# Patient Record
Sex: FEMALE | Race: OTHER | HISPANIC OR LATINO | URBAN - METROPOLITAN AREA
[De-identification: names, ages, dates, MRNs, and addresses within clinical notes are randomized per-mention and may not be internally consistent; named-entity substitution may affect disease eponyms.]

---

## 2022-07-01 ENCOUNTER — EMERGENCY (EMERGENCY)
Facility: HOSPITAL | Age: 27
LOS: 1 days | Discharge: HOME | End: 2022-07-01
Attending: EMERGENCY MEDICINE
Payer: MEDICAID

## 2022-07-01 VITALS
RESPIRATION RATE: 18 BRPM | OXYGEN SATURATION: 99 % | TEMPERATURE: 99 F | HEIGHT: 60 IN | DIASTOLIC BLOOD PRESSURE: 55 MMHG | HEART RATE: 79 BPM | SYSTOLIC BLOOD PRESSURE: 116 MMHG

## 2022-07-01 PROCEDURE — 99283 EMERGENCY DEPT VISIT LOW MDM: CPT

## 2022-07-01 PROCEDURE — 93005 ELECTROCARDIOGRAM TRACING: CPT

## 2022-07-01 PROCEDURE — 99284 EMERGENCY DEPT VISIT MOD MDM: CPT

## 2022-07-01 PROCEDURE — 93010 ELECTROCARDIOGRAM REPORT: CPT

## 2022-07-01 NOTE — ED PROVIDER NOTE - NS ED ATTENDING STATEMENT MOD
This was a shared visit with the GAURI. I reviewed and verified the documentation and independently performed the documented:

## 2022-07-01 NOTE — ED PROVIDER NOTE - PATIENT PORTAL LINK FT
You can access the FollowMyHealth Patient Portal offered by [facility] by registering at the following website: [email or web address]. By joining [employer]’s FollowMyHealth portal, you will also be able to view your health information using other applications (apps) compatible with our system.

## 2022-07-01 NOTE — ED PROVIDER NOTE - CLINICAL SUMMARY MEDICAL DECISION MAKING FREE TEXT BOX
27 yr old F presented to ED with chest pain that occurred with walking. Pt states that pain was in the middle of her chest and it has since resolved while in the ED. Pt denies any chest pain at rest , history of cardiac illness, Family history of cardiac illness. Examination: Normal findings and normal EKG. Pt D/C and will F/U with Cardiac clinic.

## 2022-07-01 NOTE — ED PROVIDER NOTE - ATTENDING APP SHARED VISIT CONTRIBUTION OF CARE
CP , now resolved  on depo shot  no other symptoms revealed to me  pe as documented  agree with care plan

## 2022-07-01 NOTE — ED ADULT TRIAGE NOTE - CHIEF COMPLAINT QUOTE
pt with generalized chest pressure worsening with ambulation. denies medical hx. ambulatory in NAD. EKG performed. pt with generalized chest pressure worsening with ambulation that has since improved. denies medical hx. ambulatory in NAD. EKG performed.

## 2022-07-01 NOTE — ED PROVIDER NOTE - NSFOLLOWUPINSTRUCTIONS_ED_ALL_ED_FT
Return to ED if chest pain reoccurs   Followup with   1 Cardiologist   2. Spine clinic   3. Followup with your primary care Physician

## 2022-07-01 NOTE — ED PROVIDER NOTE - NSFOLLOWUPCLINICS_GEN_ALL_ED_FT
[facility]  Ortho/Spine  [address] [place] [id number]  Phone: [phone]  Fax:   Follow Up Time: 7-10 Days

## 2022-07-01 NOTE — ED PROVIDER NOTE - OBJECTIVE STATEMENT
27 yr old F presented to ED with chest pain that occurred with walking. Pt states that pain was in the middle of her chest and it has since resolved while in the ED. Pt denies any chest pain at rest , history of cardiac illness, Family history of cardiac illness. Pt denies any current chest pain, dizziness, SOB, dizziness or abdominal pain. Pt denies cigarettes , ETOH or drug use. Pt admits to a history of chronic back pain and wants to be given information for back pain clinic.

## 2022-07-01 NOTE — ED PROVIDER NOTE - CARE PROVIDER_API CALL
Carlene Grimes (DO)  Internal Medicine  [address] [place] [id number]  Phone: [phone]  Fax: [phone]  Follow Up Time: 4-6 Days

## 2022-07-01 NOTE — ED PROVIDER NOTE - PROGRESS NOTE DETAILS
Pt EKG performed and shows normal sinus rhythm. Pt D/C in stable condition with F/U information for Cardiac clinic.

## 2022-07-08 PROBLEM — Z00.00 ENCOUNTER FOR PREVENTIVE HEALTH EXAMINATION: Status: ACTIVE | Noted: 2022-07-08

## 2022-07-19 ENCOUNTER — APPOINTMENT (OUTPATIENT)
Dept: ORTHOPEDIC SURGERY | Facility: CLINIC | Age: 27
End: 2022-07-19

## 2022-07-19 VITALS
HEIGHT: 61 IN | WEIGHT: 205 LBS | HEART RATE: 89 BPM | BODY MASS INDEX: 38.71 KG/M2 | DIASTOLIC BLOOD PRESSURE: 77 MMHG | SYSTOLIC BLOOD PRESSURE: 118 MMHG

## 2022-07-19 DIAGNOSIS — Z78.9 OTHER SPECIFIED HEALTH STATUS: ICD-10-CM

## 2022-07-19 PROCEDURE — 72100 X-RAY EXAM L-S SPINE 2/3 VWS: CPT

## 2022-07-19 PROCEDURE — 99203 OFFICE O/P NEW LOW 30 MIN: CPT

## 2022-07-19 NOTE — HISTORY OF PRESENT ILLNESS
[Stable] : stable [10] : a maximum pain level of 10/10 [de-identified] : Patient states she fell from standing height approximately 2 weeks ago she presented to the emergency department was diagnosed with chest pain she was referred to her primary care physician for chest pain further evaluation and work-up.  She stated at that time she has some chronic intermittent low back pain referred to orthopedics.  No complaints of weakness and/or radiculopathy in her right upper extremity and her lower extremities and of note this was conducted with a formal [facility]

## 2022-07-19 NOTE — DISCUSSION/SUMMARY
[de-identified] : Based upon age of 27 years old with an isolated right scapulothoracic syndrome patient is going to be referred to physical therapy for soft tissue modalities.  I also placed a PM&R consult in the chart because of physical therapy fails I believe physical medicine evaluation for possible MRI injection therapy etc. is reasonable prior to surgical consultations.  Patient can follow-up on a as needed basis

## 2022-07-19 NOTE — PHYSICAL EXAM
[de-identified] : CONSTITUTIONAL: The patient is a very pleasant individual who is well-nourished and who appears stated age.\par PSYCHIATRIC: The patient is alert and oriented X 3 and in no apparent distress, and participates with orthopedic evaluation well.\par HEAD: Atraumatic and is nonsyndromic in appearance.\par EENT: No visible thyromegaly, EOMI.\par RESPIRATORY: Respiratory rate is regular, not dyspneic on examination.\par LYMPHATICS: There is no inguinal lymphadenopathy\par INTEGUMENTARY: Skin is clean, dry, and intact about the bilateral lower extremities and lumbar spine.\par VASCULAR: There is brisk capillary refill about the bilateral lower extremities.\par NEUROLOGIC: There are no pathologic reflexes. There is no decrease in sensation of the bilateral lower extremities on Wartenberg pinwheel examination. Deep tendon reflexes are well maintained at 2+/4 of the bilateral lower extremities and are symmetric..\par MUSCULOSKELETAL: There is no visible muscular atrophy. Manual motor strength is well maintained in the bilateral lower extremities. Range of motion of lumbar spine is well maintained. The patient ambulates in a non-myelopathic manner. Negative tension sign and straight leg raise bilaterally. Quad extension, ankle dorsiflexion, EHL, plantar flexion, and ankle eversion are well preserved. Normal secondary orthopaedic exam of bilateral hips, greater trochanteric area, knees and ankles.  Physical exam was consistent with right scapulothoracic signs and symptoms/scapulothoracic syndrome [de-identified] : Lumbar x-ray was reviewed on today's date that shows mild age-appropriate lumbar degenerative disc disease

## 2022-10-14 ENCOUNTER — APPOINTMENT (OUTPATIENT)
Dept: OBGYN | Facility: CLINIC | Age: 27
End: 2022-10-14

## 2022-10-14 VITALS
SYSTOLIC BLOOD PRESSURE: 125 MMHG | HEIGHT: 61 IN | DIASTOLIC BLOOD PRESSURE: 72 MMHG | BODY MASS INDEX: 39.65 KG/M2 | WEIGHT: 210 LBS

## 2022-10-14 LAB
HCG UR QL: NEGATIVE
QUALITY CONTROL: YES

## 2022-10-14 PROCEDURE — 81025 URINE PREGNANCY TEST: CPT

## 2022-10-14 PROCEDURE — 99203 OFFICE O/P NEW LOW 30 MIN: CPT

## 2022-10-14 NOTE — REVIEW OF SYSTEMS
[Pelvic pain] : pelvic pain [FreeTextEntry8] : Itch, discharge [de-identified] : dark skin under breasts

## 2022-10-14 NOTE — PHYSICAL EXAM
[Chaperone Present] : A chaperone was present in the examining room during all aspects of the physical examination [Appropriately responsive] : appropriately responsive [Alert] : alert [Soft] : soft [No Acute Distress] : no acute distress [Non-distended] : non-distended [Labia Majora] : normal [Labia Minora] : normal [No Bleeding] : There was no active vaginal bleeding [Normal] : normal [Uterine Adnexae] : non-palpable [FreeTextEntry1] : ROHIT Jalloh [FreeTextEntry7] : mildly tender to mid and bilateral pelvic area on palpation [Tenderness] : nontender

## 2022-10-14 NOTE — HISTORY OF PRESENT ILLNESS
[Patient reported PAP Smear was normal] : Patient reported PAP Smear was normal [N] : Patient does not use contraception [Y] : Positive pregnancy history [Menarche Age: ____] : age at menarche was [unfilled] [Currently Active] : currently active [PapSmeardate] : 2021 [LMPDate] : 2020 [PGHxTotal] : 2 [[facility]] : 2 [[facility]] : 2 [FreeTextEntry1] : 2020

## 2022-10-14 NOTE — PLAN
[FreeTextEntry1] : -F/u labs and vaginal cultures \par -Discussed secondary amenorrhea being a possibly normal result of continued nursing \par -RTO TVUS evaluate structural causes of pelvic pain\par -Will do annual exam at time of return visit \par -Call or RTO PRN for any new problems, questions or concerns

## 2022-10-17 LAB
C TRACH RRNA SPEC QL NAA+PROBE: NOT DETECTED
CANDIDA VAG CYTO: DETECTED
ESTIMATED AVERAGE GLUCOSE: 100 MG/DL
G VAGINALIS+PREV SP MTYP VAG QL MICRO: DETECTED
HBA1C MFR BLD HPLC: 5.1 %
HCG SERPL-MCNC: <1 MIU/ML
N GONORRHOEA RRNA SPEC QL NAA+PROBE: NOT DETECTED
PROLACTIN SERPL-MCNC: 12.5 NG/ML
SOURCE AMPLIFICATION: NORMAL
T VAGINALIS VAG QL WET PREP: NOT DETECTED
TESTOST SERPL-MCNC: 45.5 NG/DL
TSH SERPL-ACNC: 2.1 UIU/ML

## 2022-10-19 ENCOUNTER — APPOINTMENT (OUTPATIENT)
Dept: ANTEPARTUM | Facility: CLINIC | Age: 27
End: 2022-10-19

## 2022-10-19 ENCOUNTER — ASOB RESULT (OUTPATIENT)
Age: 27
End: 2022-10-19

## 2022-10-19 ENCOUNTER — APPOINTMENT (OUTPATIENT)
Dept: OBGYN | Facility: CLINIC | Age: 27
End: 2022-10-19

## 2022-10-19 VITALS
DIASTOLIC BLOOD PRESSURE: 70 MMHG | HEIGHT: 61 IN | SYSTOLIC BLOOD PRESSURE: 125 MMHG | BODY MASS INDEX: 39.65 KG/M2 | WEIGHT: 210 LBS

## 2022-10-19 PROCEDURE — 76830 TRANSVAGINAL US NON-OB: CPT

## 2022-10-19 PROCEDURE — 99395 PREV VISIT EST AGE 18-39: CPT

## 2022-10-19 NOTE — HISTORY OF PRESENT ILLNESS
[N] : Patient does not use contraception [Currently Active] : currently active [Men] : men [Vaginal] : vaginal [No] : No [FreeTextEntry1] : Rylie is a 27 y.o.  female LMP uk here for an annual exam. \par \par She was here as a new pt. last week for vaginal symptoms, pelvic pain, and concern about her secondary amenorrhea since the birth of her second child. Her labs were WNL at that visit, likely amenorrhea is secondary to nursing and her prolonged use of Nexplanon followed by depo after her delivery. \par \par TVUS for pelvic pain today: retroverted homogenous uterus, endometrium measuring 2.4mm appears WNL. Right ovary presents with a simple cyst measuring 5.75cm. The left ovary appears WNL. No FF. \par \par We discussed the concerning size of this cyst, given she has continued waxing and waning pain; currently 6/10 but she describes moments of severe, sudden sharp pain that is 10/10 and then subsides. She is leaving for [facility] tomorrow for two weeks. Concern for torsion was reviewed and strict ER precautions discussed at length, even while she is away. \par \par She would like full STI screening today. \par \par [employer] #[id number] used for [place]ian translation.  [TextBox_4] : Sono and Annual [PGHxTotal] : 2 [[facility]] : 2 [[facility]] : 2

## 2022-10-19 NOTE — PLAN
[FreeTextEntry1] : -f/u pap and STI screen \par -Strict torsion precautions reviewed and reinforced, ER for any worsening pain\par -RTO as soon as she returns from trip abroad for repeat TVUS and visit with MD for cyst surveillance \par -Advised dermatology for skin check \par -Call or RTO PRN for any new problems, questions or concerns

## 2022-10-19 NOTE — PHYSICAL EXAM
[Chaperone Present] : A chaperone was present in the examining room during all aspects of the physical examination [Appropriately responsive] : appropriately responsive [Alert] : alert [No Acute Distress] : no acute distress [Oriented x3] : oriented x3 [Examination Of The Breasts] : a normal appearance [No Masses] : no breast masses were palpable [Labia Majora] : normal [Labia Minora] : normal [Discharge] : a  ~M vaginal discharge was present [Scant] : scant [White] : white [Thin] : thin [No Bleeding] : There was no active vaginal bleeding [Normal] : normal [Tenderness] : nontender [Uterine Adnexae] : non-palpable

## 2022-10-20 LAB
C TRACH RRNA SPEC QL NAA+PROBE: NOT DETECTED
HAV IGM SER QL: NONREACTIVE
HBV CORE IGM SER QL: NONREACTIVE
HBV SURFACE AG SER QL: NONREACTIVE
HCV AB SER QL: NONREACTIVE
HCV S/CO RATIO: 0.17 S/CO
HIV1+2 AB SPEC QL IA.RAPID: NONREACTIVE
N GONORRHOEA RRNA SPEC QL NAA+PROBE: NOT DETECTED
SOURCE TP AMPLIFICATION: NORMAL

## 2022-10-21 LAB — T PALLIDUM AB SER QL IA: NEGATIVE

## 2022-10-23 LAB — CYTOLOGY CVX/VAG DOC THIN PREP: NORMAL

## 2022-11-08 ENCOUNTER — ASOB RESULT (OUTPATIENT)
Age: 27
End: 2022-11-08

## 2022-11-08 ENCOUNTER — APPOINTMENT (OUTPATIENT)
Dept: OBGYN | Facility: CLINIC | Age: 27
End: 2022-11-08

## 2022-11-08 ENCOUNTER — APPOINTMENT (OUTPATIENT)
Dept: ANTEPARTUM | Facility: CLINIC | Age: 27
End: 2022-11-08

## 2022-11-08 VITALS
HEIGHT: 61 IN | BODY MASS INDEX: 40.59 KG/M2 | SYSTOLIC BLOOD PRESSURE: 120 MMHG | WEIGHT: 215 LBS | DIASTOLIC BLOOD PRESSURE: 72 MMHG

## 2022-11-08 PROCEDURE — 76830 TRANSVAGINAL US NON-OB: CPT

## 2022-11-08 PROCEDURE — 99213 OFFICE O/P EST LOW 20 MIN: CPT

## 2022-11-08 RX ORDER — PRENATAL WITH FERROUS FUM AND FOLIC ACID 3080; 920; 120; 400; 22; 1.84; 3; 20; 10; 1; 12; 200; 27; 25; 2 [IU]/1; [IU]/1; MG/1; [IU]/1; MG/1; MG/1; MG/1; MG/1; MG/1; MG/1; UG/1; MG/1; MG/1; MG/1; MG/1
27-1 TABLET ORAL
Qty: 30 | Refills: 0 | Status: ACTIVE | COMMUNITY
Start: 2022-09-13

## 2022-11-08 RX ORDER — FLUCONAZOLE 150 MG/1
150 TABLET ORAL
Qty: 2 | Refills: 1 | Status: COMPLETED | COMMUNITY
Start: 2022-10-17 | End: 2022-11-08

## 2022-11-09 NOTE — PHYSICAL EXAM
[Chaperone Present] : A chaperone was present in the examining room during all aspects of the physical examination [FreeTextEntry1] : devin [Appropriately responsive] : appropriately responsive [Alert] : alert [No Acute Distress] : no acute distress [No Lymphadenopathy] : no lymphadenopathy [Soft] : soft [Non-tender] : non-tender [Non-distended] : non-distended [Oriented x3] : oriented x3

## 2022-11-09 NOTE — DISCUSSION/SUMMARY
[FreeTextEntry1] : We reviewed sonogram which shows significant decrease in size of right ovarian cyst. I explained that she does not need repeat imaging for this cyst anymore. \par She declines to use contraception at this time as she gained weight on implant and depo and is concerned about initiating another method. We reviewed the safety profile of contraception especially as compared with pregnancy. I also explained that prolonged amenorrhea can lead to increased risk of hyperplasia or malignancy. She is considering her options and would like to RTO for further counseling. Follow up in 2 months for review of amenorrhea.

## 2022-11-09 NOTE — HISTORY OF PRESENT ILLNESS
[FreeTextEntry1] : Rylie presents for follow up of right ovarian cyst.\par Previous sonogram showing 5.8x4.7x4.4cm simple cyst\par Today the sonogram is showing 4x1.3x1.7cm simple cyst on right ovary, normal left ovary. Endometrium 3mm. \par Denies pelvic pain. She has had irregular bleeding over the past 2 weeks. \par She was referred here for prolonged amenorrhea, of over 5 months. \par She was previously using Depo and implant so she has had many years of irregular periods. She does not desire pregnancy at this time. However she gained weight on Depo and implant, and so is hesitant to initiate another birth control method. \par  was used.

## 2023-01-06 ENCOUNTER — APPOINTMENT (OUTPATIENT)
Dept: OBGYN | Facility: CLINIC | Age: 28
End: 2023-01-06
Payer: MEDICAID

## 2023-01-06 ENCOUNTER — APPOINTMENT (OUTPATIENT)
Dept: ANTEPARTUM | Facility: CLINIC | Age: 28
End: 2023-01-06
Payer: MEDICAID

## 2023-01-06 ENCOUNTER — ASOB RESULT (OUTPATIENT)
Age: 28
End: 2023-01-06

## 2023-01-06 ENCOUNTER — NON-APPOINTMENT (OUTPATIENT)
Age: 28
End: 2023-01-06

## 2023-01-06 VITALS
DIASTOLIC BLOOD PRESSURE: 72 MMHG | WEIGHT: 215 LBS | BODY MASS INDEX: 40.59 KG/M2 | HEIGHT: 61 IN | SYSTOLIC BLOOD PRESSURE: 110 MMHG

## 2023-01-06 PROCEDURE — 76817 TRANSVAGINAL US OBSTETRIC: CPT

## 2023-01-06 PROCEDURE — 99213 OFFICE O/P EST LOW 20 MIN: CPT

## 2023-01-06 NOTE — DISCUSSION/SUMMARY
[FreeTextEntry1] : 26 y/o  at 4.5w with early IUP.\par - Advised patient repeat bHCG is no longer necessary as IUP was confirmed on in office sono\par - Advised that it is too early to tell if pregnancy is viable and that only way to confirm is with ultrasound in 2w to confirm FH.\par - Advised that if she experiences any more pain or if any vaginal bleeding starts she should call the office immediately as this could indicate a miscarriage\par - Return to office in 2w for visit/sono to confirm viability, if FH present, will start prenatal care after that.\par \par Alexander Desai MD

## 2023-01-06 NOTE — HISTORY OF PRESENT ILLNESS
[N] : Patient does not use contraception [Y] : Positive pregnancy history [No] : Patient does not have concerns regarding sex [Currently Active] : currently active [PapSmeardate] : 10/19/22 [TextBox_31] : NEG [GonorrheaDate] : 10/19/22 [TextBox_63] : NEG [ChlamydiaDate] : 1019/22 [TextBox_68] : NEG [LMPDate] : 12/04/22 [PGHxTotal] : 2 [[facility]] : 2 [[facility]] : 2 [FreeTextEntry1] : 12/04/22

## 2023-01-09 ENCOUNTER — APPOINTMENT (OUTPATIENT)
Dept: ORTHOPEDIC SURGERY | Facility: CLINIC | Age: 28
End: 2023-01-09
Payer: MEDICAID

## 2023-01-09 VITALS
WEIGHT: 210 LBS | HEIGHT: 60 IN | BODY MASS INDEX: 41.23 KG/M2 | SYSTOLIC BLOOD PRESSURE: 119 MMHG | DIASTOLIC BLOOD PRESSURE: 80 MMHG | HEART RATE: 88 BPM

## 2023-01-09 PROCEDURE — 99214 OFFICE O/P EST MOD 30 MIN: CPT | Mod: 25

## 2023-01-09 PROCEDURE — 20550 NJX 1 TENDON SHEATH/LIGAMENT: CPT | Mod: RT

## 2023-01-09 NOTE — PHYSICAL EXAM
[de-identified] : She is well-appearing on examination\par \par Examination of bilateral wrists reveals tenderness at the level of the first dorsal compartment bilaterally with a positive Finkelstein bilaterally as well.

## 2023-01-09 NOTE — ASSESSMENT
[FreeTextEntry1] : ASSESSMENT:\par \par The patient comes in today with acute 3-month complaint of worsening left greater than right bilateral wrist pain that has not improved despite rest.  This is tendinosis of the first dorsal compartment bilaterally\par [I have diagnosed the patient today with a new diagnosis.]\par [This is likely to diminish bodily function for the extremity.] \par \par [The patient was given an injection today.]\par Injection:\par \par The risks and benefits of a steroid injection were discussed in detail. The risks include but are not limited to: pain, infection, swelling, flare response, bleeding, subcutaneous fat atrophy, skin depigmentation and/or elevation of blood sugar. The risk of incomplete resolution of symptoms, recurrence and additional intervention was reviewed and considered by the patient. \par The patient agreed to proceed and under a sterile prep, I injected 1 units into 2 cc of a combination of Celestone and Lidocaine into the right wrist first dorsal compartment and left wrist first dorsal compartment as 2 separate injections. The patient tolerated the injection well.\par \par She was adequately and thoroughly informed of my assessment of their current condition(s). \par \par \par \par

## 2023-01-09 NOTE — DISCUSSION/SUMMARY
[FreeTextEntry1] : 1.  I am hopeful that the bilateral wrist injections will help improve her de Quervain's bilaterally\par \par #2 she can see me again in 3 to 4 months for follow-up

## 2023-01-09 NOTE — HISTORY OF PRESENT ILLNESS
[FreeTextEntry1] : Tiffany is a pleasant 27-year-old female.  She tells me that she is pregnant.  For the last 3 months she has been having bilateral wrist discomfort worse on the left.

## 2023-01-11 ENCOUNTER — APPOINTMENT (OUTPATIENT)
Dept: OBGYN | Facility: CLINIC | Age: 28
End: 2023-01-11

## 2023-01-20 ENCOUNTER — APPOINTMENT (OUTPATIENT)
Dept: ANTEPARTUM | Facility: CLINIC | Age: 28
End: 2023-01-20

## 2023-01-20 ENCOUNTER — APPOINTMENT (OUTPATIENT)
Dept: OBGYN | Facility: CLINIC | Age: 28
End: 2023-01-20

## 2023-02-08 ENCOUNTER — OFFICE (OUTPATIENT)
Dept: URBAN - METROPOLITAN AREA CLINIC 94 | Facility: CLINIC | Age: 28
Setting detail: OPHTHALMOLOGY
End: 2023-02-08
Payer: MEDICAID

## 2023-02-08 DIAGNOSIS — H25.13: ICD-10-CM

## 2023-02-08 DIAGNOSIS — H02.89: ICD-10-CM

## 2023-02-08 DIAGNOSIS — H43.811: ICD-10-CM

## 2023-02-08 DIAGNOSIS — H10.43: ICD-10-CM

## 2023-02-08 DIAGNOSIS — H04.123: ICD-10-CM

## 2023-02-08 PROCEDURE — 99213 OFFICE O/P EST LOW 20 MIN: CPT | Performed by: REGISTERED NURSE

## 2023-02-08 ASSESSMENT — REFRACTION_MANIFEST
OD_VA1: 20/25-2
OD_CYLINDER: +0.50
OS_AXIS: 175
OS_CYLINDER: -0.50
OD_AXIS: 170
OD_AXIS: 085
OS_SPHERE: +0.75
OS_CYLINDER: -0.75
OS_AXIS: 075
OS_SPHERE: PLANO
OD_SPHERE: PL
OD_CYLINDER: -0.75
OS_VA1: 20/20
OD_SPHERE: +0.50
OS_AXIS: 165
OS_SPHERE: PL
OD_SPHERE: -0.50
OD_VA1: 20/20
OD_CYLINDER: -0.50
OS_VA1: 20/20-2
OD_AXIS: 175
OS_CYLINDER: +0.25

## 2023-02-08 ASSESSMENT — REFRACTION_AUTOREFRACTION
OD_SPHERE: +0.75
OS_CYLINDER: -0.50
OS_SPHERE: +1.00
OD_CYLINDER: -0.50
OS_AXIS: 163
OD_AXIS: 170

## 2023-02-08 ASSESSMENT — LID EXAM ASSESSMENTS
OS_MEIBOMITIS: LLL 1+ 2+
OD_MEIBOMITIS: RLL 1+

## 2023-02-08 ASSESSMENT — CONFRONTATIONAL VISUAL FIELD TEST (CVF)
OD_FINDINGS: FULL
OS_FINDINGS: FULL

## 2023-02-08 ASSESSMENT — PACHYMETRY
OS_CT_CORRECTION: -5
OS_CT_UM: 617
OD_CT_UM: 644
OD_CT_CORRECTION: -7

## 2023-02-08 ASSESSMENT — AXIALLENGTH_DERIVED
OD_AL: 24.5038
OD_AL: 24.1925
OD_AL: 24.2954
OS_AL: 24.235
OS_AL: 24.3383

## 2023-02-08 ASSESSMENT — KERATOMETRY
OD_K2POWER_DIOPTERS: 41.75
OS_K1POWER_DIOPTERS: 40.75
OD_K1POWER_DIOPTERS: 41.00
OS_K2POWER_DIOPTERS: 41.25
OS_AXISANGLE_DEGREES: 086
METHOD_AUTO_MANUAL: AUTO
OD_AXISANGLE_DEGREES: 086

## 2023-02-08 ASSESSMENT — VISUAL ACUITY
OS_BCVA: 20/25
OD_BCVA: 20/25

## 2023-02-08 ASSESSMENT — SUPERFICIAL PUNCTATE KERATITIS (SPK)
OS_SPK: 1+
OD_SPK: 1+

## 2023-02-08 ASSESSMENT — SPHEQUIV_DERIVED
OS_SPHEQUIV: 0.5
OD_SPHEQUIV: -0.25
OD_SPHEQUIV: 0.25
OS_SPHEQUIV: 0.75
OD_SPHEQUIV: 0.5

## 2023-02-08 ASSESSMENT — TONOMETRY
OD_IOP_MMHG: 19
OS_IOP_MMHG: 21

## 2023-02-28 ENCOUNTER — OFFICE (OUTPATIENT)
Dept: URBAN - METROPOLITAN AREA CLINIC 94 | Facility: CLINIC | Age: 28
Setting detail: OPHTHALMOLOGY
End: 2023-02-28
Payer: MEDICAID

## 2023-02-28 DIAGNOSIS — H43.811: ICD-10-CM

## 2023-02-28 DIAGNOSIS — H02.89: ICD-10-CM

## 2023-02-28 DIAGNOSIS — H25.13: ICD-10-CM

## 2023-02-28 DIAGNOSIS — H04.123: ICD-10-CM

## 2023-02-28 PROBLEM — H16.223 DRY EYE SYNDROME K SICCA; BOTH EYES: Status: ACTIVE | Noted: 2023-02-08

## 2023-02-28 PROCEDURE — 99213 OFFICE O/P EST LOW 20 MIN: CPT | Performed by: REGISTERED NURSE

## 2023-02-28 ASSESSMENT — REFRACTION_AUTOREFRACTION
OD_AXIS: 173
OD_CYLINDER: -0.50
OS_SPHERE: +1.00
OS_AXIS: 162
OS_CYLINDER: -0.75
OD_SPHERE: +0.75

## 2023-02-28 ASSESSMENT — REFRACTION_MANIFEST
OS_CYLINDER: -0.75
OD_AXIS: 175
OD_AXIS: 085
OS_AXIS: 075
OS_VA1: 20/20-2
OD_CYLINDER: -0.55
OS_SPHERE: +0.75
OS_AXIS: 175
OS_SPHERE: PL
OS_SPHERE: PLANO
OD_VA1: 20/25-2
OD_SPHERE: -0.50
OD_AXIS: 170
OD_CYLINDER: +0.50
OS_VA1: 20/20
OS_CYLINDER: +0.25
OD_VA1: 20/20
OD_CYLINDER: -0.75
OS_CYLINDER: -0.50
OD_SPHERE: +0.50
OS_AXIS: 165
OD_SPHERE: PL

## 2023-02-28 ASSESSMENT — SPHEQUIV_DERIVED
OD_SPHEQUIV: -0.25
OS_SPHEQUIV: 0.5
OS_SPHEQUIV: 0.625
OD_SPHEQUIV: 0.225
OD_SPHEQUIV: 0.5

## 2023-02-28 ASSESSMENT — LID EXAM ASSESSMENTS
OS_MEIBOMITIS: LLL 1+ 2+
OD_MEIBOMITIS: RLL 1+

## 2023-02-28 ASSESSMENT — VISUAL ACUITY
OD_BCVA: 20/25
OS_BCVA: 20/25

## 2023-02-28 ASSESSMENT — CONFRONTATIONAL VISUAL FIELD TEST (CVF)
OS_FINDINGS: FULL
OD_FINDINGS: FULL

## 2023-02-28 ASSESSMENT — SUPERFICIAL PUNCTATE KERATITIS (SPK)
OD_SPK: 1+
OS_SPK: 1+

## 2023-03-09 ENCOUNTER — APPOINTMENT (OUTPATIENT)
Dept: ORTHOPEDIC SURGERY | Facility: CLINIC | Age: 28
End: 2023-03-09
Payer: MEDICAID

## 2023-03-09 VITALS
BODY MASS INDEX: 41.23 KG/M2 | DIASTOLIC BLOOD PRESSURE: 75 MMHG | HEART RATE: 89 BPM | WEIGHT: 210 LBS | SYSTOLIC BLOOD PRESSURE: 111 MMHG | HEIGHT: 60 IN

## 2023-03-09 PROCEDURE — 20610 DRAIN/INJ JOINT/BURSA W/O US: CPT | Mod: LT

## 2023-03-09 PROCEDURE — 99215 OFFICE O/P EST HI 40 MIN: CPT | Mod: 25

## 2023-03-09 PROCEDURE — 20550 NJX 1 TENDON SHEATH/LIGAMENT: CPT | Mod: 59,LT

## 2023-03-09 NOTE — ASSESSMENT
[FreeTextEntry1] : ASSESSMENT:\par \par The patient comes in today with multiple complaints including left shoulder discomfort in the form of tendinopathy and impingement, left elbow pain in the form of lateral epicondylitis tendinitis, left wrist pain in the form of known first dorsal compartment tendinopathy status post 1 prior injection as well as symptoms of carpal tunnel disease on the left.\par Lastly, she has physical exam findings as well as a history suggestive of patellofemoral syndrome on the left.  For this I do recommend physical therapy as well\par [I have diagnosed the patient today with a new diagnosis - This may diminish bodily function for the extremity.] \par \par [For this I was able to review other physician’s note(s) including reviewing other tests, imaging results as well as personally view these results for my own interpretation.]\par I have sent her for work-up including an EMG nerve study\par \par Left SUBACROMIAL SHOULDER INJECTION\par \par Indication:  subacromial bursitis/impingement, pain\par \par Risk, benefits and alternatives were discussed with the patient. Potential complications include bleeding and infection. \par Alcohol was used to prep the area.  Ethyl chloride spray was used as a topical anesthetic.  Using sterile technique, the injection needle was then directed from a standard posterior approach parallel to and inferior to the acromion. .  \par A 21g needle was used to inject 5 mL of 1% Lidocaine and 2 mL Kenalog.  No significant resistance was encountered.   \par A bandage was applied.  The patient tolerated the procedure well.   \par \par Patient instructed to avoid strenuous activity for 2 day(s). \par Specifically counseled regarding the signs and symptoms of potential infection and instructed to present promptly to clinic or hospital if such signs and symptoms arise.\par \par Injection:\par \par The risks and benefits of a steroid injection were discussed in detail. The risks include but are not limited to: pain, infection, swelling, flare response, bleeding, subcutaneous fat atrophy, skin depigmentation and/or elevation of blood sugar. The risk of incomplete resolution of symptoms, recurrence and additional intervention was reviewed and considered by the patient. \par The patient agreed to proceed and under a sterile prep, I injected 1 unit into 2 cc of a combination of Celestone and Lidocaine into the left wrist first dorsal compartment. The patient tolerated the injection well.\par \par \par The patient was adequately and thoroughly informed of my assessment of their current condition(s). \par \par DISCUSSION:\par 1.  I am hopeful that the injection to the left shoulder and the left wrist will help relieve her tendinopathy symptoms\par 2.  I would like to see her back when she gets the results of the nerve study\par 3.  She will keep wearing her left wrist brace which does help with her carpal tunnel symptoms as well as her elbow tendinopathy\par #4 for her left shoulder as well as left knee symptoms I have prescribed physical therapy\par

## 2023-03-09 NOTE — HISTORY OF PRESENT ILLNESS
[FreeTextEntry1] : Rylie returns for follow-up.  Her left wrist symptoms have recurred and are worsening as of recently.  Right side has improved dramatically after injection therapy.\par She also complains of worsening left shoulder pain that wakes her up at night and with difficulty with activities that require overhead motion.\par She also complains of worsening left wrist and hand discomfort with numbness and tingling that wakes her up at night.\par Lastly, she also complains of left elbow pain specifically pointing to the lateral aspect.

## 2023-03-09 NOTE — PHYSICAL EXAM
[de-identified] : She is well-appearing on exam\par \par Examination of the left shoulder reveals equal active and passive motion as compared to the contralateral side\par There is a positive Speed, positive Sheryl, positive Speed, tenderness with anterior shoulder compression\par \par Examination of the left elbow reveals tenderness at the level of the lateral epicondyle. There is pain with resisted wrist and finger extension at the elbow. \par \par Examination of the left wrist reveals tenderness at the level of the first dorsal compartment with a positive finkelstein's examination\par \par Examination of the left wrist reveals discomfort with compression at the level of the volar carpal tunnel eliciting numbness/tingling throughout the fingertips\par \par Examination of the left knee reveals tenderness with patellar grinding as well as weakness with hip flexion denoting patellofemoral syndrome

## 2023-05-26 ENCOUNTER — ASOB RESULT (OUTPATIENT)
Age: 28
End: 2023-05-26

## 2023-05-26 ENCOUNTER — APPOINTMENT (OUTPATIENT)
Dept: ANTEPARTUM | Facility: CLINIC | Age: 28
End: 2023-05-26
Payer: MEDICAID

## 2023-05-26 PROCEDURE — 76811 OB US DETAILED SNGL FETUS: CPT

## 2023-06-05 ENCOUNTER — NON-APPOINTMENT (OUTPATIENT)
Age: 28
End: 2023-06-05

## 2023-06-08 ENCOUNTER — OFFICE (OUTPATIENT)
Dept: URBAN - METROPOLITAN AREA CLINIC 94 | Facility: CLINIC | Age: 28
Setting detail: OPHTHALMOLOGY
End: 2023-06-08
Payer: COMMERCIAL

## 2023-06-08 DIAGNOSIS — H43.811: ICD-10-CM

## 2023-06-08 DIAGNOSIS — H04.123: ICD-10-CM

## 2023-06-08 DIAGNOSIS — H25.13: ICD-10-CM

## 2023-06-08 DIAGNOSIS — H10.011: ICD-10-CM

## 2023-06-08 DIAGNOSIS — H02.89: ICD-10-CM

## 2023-06-08 PROCEDURE — 99213 OFFICE O/P EST LOW 20 MIN: CPT | Performed by: OPHTHALMOLOGY

## 2023-06-08 ASSESSMENT — AXIALLENGTH_DERIVED
OS_AL: 24.4513
OD_AL: 24.4
OD_AL: 24.6032
OS_AL: 24.1925
OD_AL: 24.3412

## 2023-06-08 ASSESSMENT — REFRACTION_MANIFEST
OS_SPHERE: PLANO
OD_SPHERE: PL
OD_AXIS: 085
OD_VA1: 20/20
OS_VA1: 20/20-2
OS_SPHERE: PL
OS_AXIS: 075
OD_AXIS: 175
OS_AXIS: 175
OD_VA1: 20/25-2
OS_SPHERE: +0.75
OD_CYLINDER: -0.55
OS_CYLINDER: +0.25
OS_CYLINDER: -0.75
OD_SPHERE: +0.50
OS_AXIS: 165
OS_CYLINDER: -0.50
OD_CYLINDER: -0.75
OD_CYLINDER: +0.50
OD_SPHERE: -0.50
OS_VA1: 20/20
OD_AXIS: 170

## 2023-06-08 ASSESSMENT — SUPERFICIAL PUNCTATE KERATITIS (SPK)
OS_SPK: 1+
OD_SPK: 1+

## 2023-06-08 ASSESSMENT — KERATOMETRY
OS_K1POWER_DIOPTERS: 40.50
METHOD_AUTO_MANUAL: AUTO
OD_AXISANGLE_DEGREES: 075
OS_K2POWER_DIOPTERS: 42.25
OS_AXISANGLE_DEGREES: 102
OD_K1POWER_DIOPTERS: 40.75
OD_K2POWER_DIOPTERS: 41.50

## 2023-06-08 ASSESSMENT — LID EXAM ASSESSMENTS
OS_MEIBOMITIS: LLL 1+ 2+
OD_MEIBOMITIS: RLL 1+

## 2023-06-08 ASSESSMENT — REFRACTION_AUTOREFRACTION
OD_CYLINDER: -0.25
OD_AXIS: 146
OS_AXIS: 008
OS_CYLINDER: -1.25
OS_SPHERE: +0.50
OD_SPHERE: +0.50

## 2023-06-08 ASSESSMENT — VISUAL ACUITY
OD_BCVA: 20/30
OS_BCVA: 20/20-1

## 2023-06-08 ASSESSMENT — PACHYMETRY
OD_CT_UM: 644
OS_CT_UM: 617
OD_CT_CORRECTION: -7
OS_CT_CORRECTION: -5

## 2023-06-08 ASSESSMENT — TONOMETRY
OS_IOP_MMHG: 17
OD_IOP_MMHG: 17

## 2023-06-08 ASSESSMENT — CONFRONTATIONAL VISUAL FIELD TEST (CVF)
OS_FINDINGS: FULL
OD_FINDINGS: FULL

## 2023-06-08 ASSESSMENT — SPHEQUIV_DERIVED
OD_SPHEQUIV: 0.225
OD_SPHEQUIV: -0.25
OS_SPHEQUIV: 0.5
OD_SPHEQUIV: 0.375
OS_SPHEQUIV: -0.125

## 2023-06-19 ENCOUNTER — OFFICE (OUTPATIENT)
Dept: URBAN - METROPOLITAN AREA CLINIC 94 | Facility: CLINIC | Age: 28
Setting detail: OPHTHALMOLOGY
End: 2023-06-19
Payer: COMMERCIAL

## 2023-06-19 DIAGNOSIS — H43.811: ICD-10-CM

## 2023-06-19 DIAGNOSIS — H10.011: ICD-10-CM

## 2023-06-19 DIAGNOSIS — H47.233: ICD-10-CM

## 2023-06-19 DIAGNOSIS — H02.89: ICD-10-CM

## 2023-06-19 DIAGNOSIS — H04.123: ICD-10-CM

## 2023-06-19 DIAGNOSIS — H25.13: ICD-10-CM

## 2023-06-19 DIAGNOSIS — H43.391: ICD-10-CM

## 2023-06-19 PROCEDURE — 99212 OFFICE O/P EST SF 10 MIN: CPT | Performed by: OPHTHALMOLOGY

## 2023-06-19 ASSESSMENT — PACHYMETRY
OS_CT_CORRECTION: -5
OD_CT_CORRECTION: -7
OD_CT_UM: 644
OS_CT_UM: 617

## 2023-06-19 ASSESSMENT — LID EXAM ASSESSMENTS
OD_MEIBOMITIS: RLL 1+
OS_MEIBOMITIS: LLL 1+ 2+

## 2023-06-19 ASSESSMENT — REFRACTION_MANIFEST
OS_AXIS: 175
OS_AXIS: 075
OD_SPHERE: +0.50
OS_VA1: 20/20
OD_SPHERE: PL
OS_AXIS: 165
OD_SPHERE: -0.50
OD_VA1: 20/20
OS_SPHERE: PL
OD_VA1: 20/25-2
OS_CYLINDER: +0.25
OS_CYLINDER: -0.50
OS_VA1: 20/20-2
OD_AXIS: 170
OD_CYLINDER: -0.55
OD_AXIS: 085
OD_CYLINDER: +0.50
OS_CYLINDER: -0.75
OS_SPHERE: PLANO
OD_CYLINDER: -0.75
OS_SPHERE: +0.75
OD_AXIS: 175

## 2023-06-19 ASSESSMENT — AXIALLENGTH_DERIVED
OS_AL: 24.3323
OD_AL: 24.238
OD_AL: 24.6032
OS_AL: 24.4364
OD_AL: 24.4

## 2023-06-19 ASSESSMENT — TONOMETRY
OD_IOP_MMHG: 17
OS_IOP_MMHG: 18

## 2023-06-19 ASSESSMENT — REFRACTION_AUTOREFRACTION
OD_CYLINDER: -0.25
OS_SPHERE: +1.00
OD_SPHERE: +0.75
OD_AXIS: 170
OS_CYLINDER: -0.50
OS_AXIS: 158

## 2023-06-19 ASSESSMENT — SPHEQUIV_DERIVED
OD_SPHEQUIV: -0.25
OS_SPHEQUIV: 0.5
OS_SPHEQUIV: 0.75
OD_SPHEQUIV: 0.225
OD_SPHEQUIV: 0.625

## 2023-06-19 ASSESSMENT — KERATOMETRY
OS_K1POWER_DIOPTERS: 40.25
OD_AXISANGLE_DEGREES: 085
OD_K1POWER_DIOPTERS: 40.75
OD_K2POWER_DIOPTERS: 41.50
METHOD_AUTO_MANUAL: AUTO
OS_K2POWER_DIOPTERS: 41.25
OS_AXISANGLE_DEGREES: 009

## 2023-06-19 ASSESSMENT — SUPERFICIAL PUNCTATE KERATITIS (SPK)
OD_SPK: 1+
OS_SPK: 1+

## 2023-06-19 ASSESSMENT — VISUAL ACUITY
OD_BCVA: 20/20
OS_BCVA: 20/20-1

## 2023-06-19 ASSESSMENT — CONFRONTATIONAL VISUAL FIELD TEST (CVF)
OS_FINDINGS: FULL
OD_FINDINGS: FULL

## 2023-06-27 ENCOUNTER — APPOINTMENT (OUTPATIENT)
Dept: MATERNAL FETAL MEDICINE | Facility: CLINIC | Age: 28
End: 2023-06-27
Payer: MEDICAID

## 2023-06-27 ENCOUNTER — ASOB RESULT (OUTPATIENT)
Age: 28
End: 2023-06-27

## 2023-06-27 VITALS — WEIGHT: 250 LBS | HEIGHT: 61 IN | BODY MASS INDEX: 47.2 KG/M2

## 2023-06-27 DIAGNOSIS — Z78.9 OTHER SPECIFIED HEALTH STATUS: ICD-10-CM

## 2023-06-27 PROCEDURE — G0108 DIAB MANAGE TRN  PER INDIV: CPT | Mod: 95

## 2023-06-27 RX ORDER — BLOOD SUGAR DIAGNOSTIC
STRIP MISCELLANEOUS 4 TIMES DAILY
Qty: 1 | Refills: 2 | Status: ACTIVE | COMMUNITY
Start: 2023-06-27 | End: 1900-01-01

## 2023-06-27 RX ORDER — LANCETS 28 GAUGE
EACH MISCELLANEOUS
Qty: 1 | Refills: 2 | Status: ACTIVE | COMMUNITY
Start: 2023-06-27 | End: 1900-01-01

## 2023-07-05 ENCOUNTER — APPOINTMENT (OUTPATIENT)
Dept: MATERNAL FETAL MEDICINE | Facility: CLINIC | Age: 28
End: 2023-07-05
Payer: MEDICAID

## 2023-07-05 ENCOUNTER — ASOB RESULT (OUTPATIENT)
Age: 28
End: 2023-07-05

## 2023-07-05 PROCEDURE — G0108 DIAB MANAGE TRN  PER INDIV: CPT | Mod: 95

## 2023-07-06 DIAGNOSIS — Z01.411 ENCOUNTER FOR GYNECOLOGICAL EXAMINATION (GENERAL) (ROUTINE) WITH ABNORMAL FINDINGS: ICD-10-CM

## 2023-07-06 DIAGNOSIS — Z11.3 ENCOUNTER FOR SCREENING FOR INFECTIONS WITH A PREDOMINANTLY SEXUAL MODE OF TRANSMISSION: ICD-10-CM

## 2023-07-06 DIAGNOSIS — N91.1 SECONDARY AMENORRHEA: ICD-10-CM

## 2023-07-06 DIAGNOSIS — M75.51 BURSITIS OF RIGHT SHOULDER: ICD-10-CM

## 2023-07-07 ENCOUNTER — ASOB RESULT (OUTPATIENT)
Age: 28
End: 2023-07-07

## 2023-07-07 ENCOUNTER — APPOINTMENT (OUTPATIENT)
Dept: ANTEPARTUM | Facility: CLINIC | Age: 28
End: 2023-07-07
Payer: MEDICAID

## 2023-07-07 PROCEDURE — 76816 OB US FOLLOW-UP PER FETUS: CPT

## 2023-07-13 ENCOUNTER — APPOINTMENT (OUTPATIENT)
Dept: MATERNAL FETAL MEDICINE | Facility: CLINIC | Age: 28
End: 2023-07-13
Payer: MEDICAID

## 2023-07-13 ENCOUNTER — APPOINTMENT (OUTPATIENT)
Dept: ANTEPARTUM | Facility: CLINIC | Age: 28
End: 2023-07-13

## 2023-07-13 VITALS
RESPIRATION RATE: 18 BRPM | WEIGHT: 233 LBS | HEART RATE: 85 BPM | DIASTOLIC BLOOD PRESSURE: 40 MMHG | OXYGEN SATURATION: 97 % | BODY MASS INDEX: 43.99 KG/M2 | HEIGHT: 61 IN | SYSTOLIC BLOOD PRESSURE: 102 MMHG

## 2023-07-13 PROCEDURE — 99205 OFFICE O/P NEW HI 60 MIN: CPT | Mod: TH

## 2023-07-13 NOTE — FAMILY HISTORY
[Age 35+ During Pregnancy] : not 35 or over during pregnancy [Reported Family History Of Birth Defects] : no congenital heart defects [Gagan-Sachs Carrier] : no Gagan-Sachs [Family History] : no mental retardation/autism [Reported Family History Of Genetic Disease] : no history of child defect in child of baby father

## 2023-07-13 NOTE — VITALS
[LMP (date): ___] : LMP was on [unfilled] [GA =___ Weeks] : which calculates to a GA of [unfilled] weeks [GA= ___ Days] : and [unfilled] day(s) [GUEVARA by LMP (date): ___] : The calculated GUEVARA by LMP is [unfilled] [By LMP] : this is the final GUEVARA

## 2023-07-13 NOTE — DISCUSSION/SUMMARY
[FreeTextEntry1] : She is 31 weeks and 4 days gestation by her last menstrual period dates.  The fetal heart rate was 130 bpm.\par \par She is overweight and obesity has been associated with a number of maternal complications such as gestational diabetes, pre-eclampsia, thrombophlebitis, labor abnormalities, post-term pregnancies,  delivery, and operative complications. Obesity has been associated with adverse fetal outcomes such as late stillbirth and  deliveries.  Obese women also have a two to three-fold increased incidence in congenital anomalies. There were no major fetal malformations seen during the fetal anatomy ultrasound examination. She has been diagnosed with gestational diabetes.\par \par Regarding her gestational diabetes, she had a telehealth visit with the diabetes educator on 2023 for initial diabetes education and counseling.  She had a follow-up telehealth visit with the diabetes educator on 2023. She states that she has been trying to follow the recommended diabetic diet.  She eats 3 meals with an occasional snack.  She was advised to do fasting and 1 hour postprandial self glucose monitoring from the beginning of the meal.  She has not been testing her capillary glucose as recommended.  She told me that she has been testing her sugar a few minutes after eating.  She is not keeping a daily food diary.  She is also not keeping a daily glucose diary.  At this time I am not able to assess her glycemic control.  I informed her that maintaining euglycemia is the most important factor associated with good  outcomes in pregnancies complicated by gestational diabetes. I told her that poor glucose control may cause fetal macrosomia, shoulder dystocia,  delivery, stillbirth,  respiratory distress syndrome and  metabolic complications such as hypoglycemia and hyperbilirubinemia.  I told her that she is at risk for developing gestational hypertension or preeclampsia during the current pregnancy. I also told her that she is at risk for developing type 2 diabetes, metabolic syndrome and cardiovascular disease later in life.  I also recommend having a 75 gram 2 hour OGTT approximately 6 - 8 weeks postpartum to determine whether she has impaired glucose tolerance or preexisting diabetes not diagnosed prior to the pregnancy.  I gave her dietary counseling.  I discussed which foods to eat and which foods not to eat.  I told her to eat three daily meals with 3 snacks to reduce postprandial glucose fluctuations. I gave her a food diary to write her daily food intake and capillary glucose values. She was advised to bring the food / glucose diary to her prenatal visits.  I ordered a hemoglobin A1c level.  She has been scheduled to have a follow-up telehealth visit with the diabetes educator on 2023.  She was advised to have a follow-up MFM visit in 2 weeks.

## 2023-07-13 NOTE — OB HISTORY
[Pregnancy History] : patient received anesthesia [LMP: ___] : LMP: [unfilled] [GUEVARA: ___] : GUEVARA: [unfilled] [EGA: ___ wks] : EGA: [unfilled] wks [Spontaneous] : Spontaneous conception [Sonogram] : sonogram [at ___ wks] : at [unfilled] weeks [Definite:  ___ (Date)] : the last menstrual period was [unfilled] [Normal Amount/Duration] : was of a normal amount and duration [Regular Cycle Intervals] : periods have been regular [___] : no pregnancy complications reported [FreeTextEntry1] : Prenatal medical records were not available for review.\par \par The 3-hour GTT done on 5/31/2023 reported a fasting glucose of 166, 1 hour glucose of 221, 2-hour glucose of 158, and 3-hour glucose of 131.  There were 3 elevated glucose values consistent with a diagnosis of gestational diabetes.  [Spotting Between  Menses] : no spotting between menses [Menstrual Cramps] : no menstrual cramps [On BCP at conception] : the patient was not on BCP at conception

## 2023-07-14 LAB
ESTIMATED AVERAGE GLUCOSE: 117 MG/DL
HBA1C MFR BLD HPLC: 5.7 %

## 2023-07-20 ENCOUNTER — ASOB RESULT (OUTPATIENT)
Age: 28
End: 2023-07-20

## 2023-07-20 ENCOUNTER — APPOINTMENT (OUTPATIENT)
Dept: MATERNAL FETAL MEDICINE | Facility: CLINIC | Age: 28
End: 2023-07-20
Payer: MEDICAID

## 2023-07-20 PROCEDURE — G0108 DIAB MANAGE TRN  PER INDIV: CPT | Mod: 95

## 2023-07-20 RX ORDER — METFORMIN HYDROCHLORIDE 500 MG/1
500 TABLET, COATED ORAL
Qty: 30 | Refills: 1 | Status: ACTIVE | COMMUNITY
Start: 2023-07-20 | End: 1900-01-01

## 2023-07-26 ENCOUNTER — ASOB RESULT (OUTPATIENT)
Age: 28
End: 2023-07-26

## 2023-07-26 ENCOUNTER — APPOINTMENT (OUTPATIENT)
Dept: ANTEPARTUM | Facility: CLINIC | Age: 28
End: 2023-07-26
Payer: MEDICAID

## 2023-07-26 ENCOUNTER — APPOINTMENT (OUTPATIENT)
Dept: MATERNAL FETAL MEDICINE | Facility: CLINIC | Age: 28
End: 2023-07-26
Payer: MEDICAID

## 2023-07-26 VITALS
OXYGEN SATURATION: 98 % | WEIGHT: 231 LBS | BODY MASS INDEX: 43.61 KG/M2 | HEIGHT: 61 IN | DIASTOLIC BLOOD PRESSURE: 74 MMHG | RESPIRATION RATE: 16 BRPM | HEART RATE: 78 BPM | SYSTOLIC BLOOD PRESSURE: 116 MMHG

## 2023-07-26 VITALS
BODY MASS INDEX: 43.61 KG/M2 | HEART RATE: 78 BPM | WEIGHT: 231 LBS | HEIGHT: 61 IN | DIASTOLIC BLOOD PRESSURE: 74 MMHG | RESPIRATION RATE: 16 BRPM | SYSTOLIC BLOOD PRESSURE: 116 MMHG | OXYGEN SATURATION: 98 %

## 2023-07-26 PROCEDURE — 99215 OFFICE O/P EST HI 40 MIN: CPT | Mod: TH

## 2023-07-26 PROCEDURE — 76820 UMBILICAL ARTERY ECHO: CPT

## 2023-07-26 PROCEDURE — 76819 FETAL BIOPHYS PROFIL W/O NST: CPT

## 2023-07-26 NOTE — VITALS
[LMP (date): ___] : LMP was on [unfilled] [GUEVARA by LMP (date): ___] : The calculated GUEVARA by LMP is [unfilled] [By LMP] : this is the final GUEVARA [GA =___ Weeks] : which calculates to a GA of [unfilled] weeks [GA= ___ Days] : and [unfilled] day(s)

## 2023-07-26 NOTE — DISCUSSION/SUMMARY
[FreeTextEntry1] : She is 33 weeks and 3 days gestation by her last menstrual period dates.  \par \par Regarding her gestational diabetes, she had a telehealth visit with the diabetes educator on 2023 for initial diabetes education and counseling.  She had follow-up telehealth visits with the diabetes educator on 2023 and 2023.  She was prescribed metformin 500 mg to take in the morning due to elevated postprandial glucose readings after meals.  She has been trying to follow the recommended diabetic diet.  She states that she has not been eating breakfast.  She has been eating 2 meals with an occasional snack. She stopped taking the metformin medication because she was having an upset stomach after taking the medication on an empty stomach.  I advised her to take the metformin medication with a morning snack or when she eats lunch.  I encouraged her to do fasting and 1 hour postprandial self glucose monitoring from the beginning of the meal.  She is now testing her capillary glucose as recommended.  She is now keeping a daily food diary.  She is now also keeping a daily glucose diary.  Review of the food/glucose diary from 2023 to 2023 revealed all fasting glucose readings to be within normal limits.  She had 6 postprandial glucose elevations  (range 142 - 190).  Hemoglobin A1c done on 2023 was 5.7% which corresponds to an estimated daily average glucose of 117 mg/dL.  I reminded her that maintaining euglycemia is the most important factor associated with good  outcomes in pregnancies complicated by gestational diabetes. I told her that poor glucose control may cause fetal macrosomia, shoulder dystocia,  delivery, stillbirth,  respiratory distress syndrome and  metabolic complications such as hypoglycemia and hyperbilirubinemia.  I gave her dietary counseling.  I again discussed which foods to eat and which foods not to eat.  I encouraged her to eat three daily meals with 3 snacks to reduce postprandial glucose fluctuations.  I reminded her to bring the food / glucose diary to all prenatal visits.  She has been scheduled to have a follow-up telehealth visit with the diabetes educator on 2023.  She was advised to have a follow-up MFM visit in 3 weeks.  She has been scheduled to have weekly fetal testing until delivery.  She was also scheduled to have an ultrasound examination for fetal growth in 1 week.  \par \par

## 2023-07-26 NOTE — OB HISTORY
[Pregnancy History] : patient received anesthesia [LMP: ___] : LMP: [unfilled] [GUEVARA: ___] : GUEVARA: [unfilled] [Spontaneous] : Spontaneous conception [Sonogram] : sonogram [at ___ wks] : at [unfilled] weeks [Definite:  ___ (Date)] : the last menstrual period was [unfilled] [Normal Amount/Duration] : was of a normal amount and duration [Regular Cycle Intervals] : periods have been regular [___] : no pregnancy complications reported [EGA: ___ wks] : EGA: [unfilled] wks [FreeTextEntry1] : Prenatal medical records were not available for review.\par \par The 3-hour GTT done on 5/31/2023 reported a fasting glucose of 166, 1 hour glucose of 221, 2-hour glucose of 158, and 3-hour glucose of 131.  There were 3 elevated glucose values consistent with a diagnosis of gestational diabetes. \par \par She had an MFM consultation on 7/13/2023. [Spotting Between  Menses] : no spotting between menses [Menstrual Cramps] : no menstrual cramps [On BCP at conception] : the patient was not on BCP at conception

## 2023-07-31 ENCOUNTER — APPOINTMENT (OUTPATIENT)
Dept: ANTEPARTUM | Facility: CLINIC | Age: 28
End: 2023-07-31
Payer: MEDICAID

## 2023-07-31 ENCOUNTER — ASOB RESULT (OUTPATIENT)
Age: 28
End: 2023-07-31

## 2023-07-31 PROCEDURE — 76820 UMBILICAL ARTERY ECHO: CPT

## 2023-07-31 PROCEDURE — 76818 FETAL BIOPHYS PROFILE W/NST: CPT

## 2023-08-07 ENCOUNTER — ASOB RESULT (OUTPATIENT)
Age: 28
End: 2023-08-07

## 2023-08-07 ENCOUNTER — APPOINTMENT (OUTPATIENT)
Dept: ANTEPARTUM | Facility: CLINIC | Age: 28
End: 2023-08-07
Payer: MEDICAID

## 2023-08-07 PROCEDURE — 76820 UMBILICAL ARTERY ECHO: CPT | Mod: 59

## 2023-08-07 PROCEDURE — 76818 FETAL BIOPHYS PROFILE W/NST: CPT

## 2023-08-07 PROCEDURE — 76816 OB US FOLLOW-UP PER FETUS: CPT

## 2023-08-09 ENCOUNTER — ASOB RESULT (OUTPATIENT)
Age: 28
End: 2023-08-09

## 2023-08-09 ENCOUNTER — APPOINTMENT (OUTPATIENT)
Dept: MATERNAL FETAL MEDICINE | Facility: CLINIC | Age: 28
End: 2023-08-09
Payer: MEDICAID

## 2023-08-09 VITALS — WEIGHT: 232 LBS | HEIGHT: 61 IN | BODY MASS INDEX: 43.8 KG/M2

## 2023-08-09 PROCEDURE — G0108 DIAB MANAGE TRN  PER INDIV: CPT | Mod: 95

## 2023-08-14 ENCOUNTER — APPOINTMENT (OUTPATIENT)
Dept: MATERNAL FETAL MEDICINE | Facility: CLINIC | Age: 28
End: 2023-08-14
Payer: MEDICAID

## 2023-08-14 ENCOUNTER — APPOINTMENT (OUTPATIENT)
Dept: ANTEPARTUM | Facility: CLINIC | Age: 28
End: 2023-08-14
Payer: MEDICAID

## 2023-08-14 ENCOUNTER — ASOB RESULT (OUTPATIENT)
Age: 28
End: 2023-08-14

## 2023-08-14 VITALS
OXYGEN SATURATION: 98 % | WEIGHT: 233 LBS | BODY MASS INDEX: 43.99 KG/M2 | DIASTOLIC BLOOD PRESSURE: 76 MMHG | HEART RATE: 84 BPM | HEIGHT: 61 IN | RESPIRATION RATE: 16 BRPM | SYSTOLIC BLOOD PRESSURE: 118 MMHG

## 2023-08-14 VITALS
HEIGHT: 61 IN | DIASTOLIC BLOOD PRESSURE: 76 MMHG | RESPIRATION RATE: 16 BRPM | WEIGHT: 233 LBS | HEART RATE: 84 BPM | OXYGEN SATURATION: 98 % | BODY MASS INDEX: 43.99 KG/M2 | SYSTOLIC BLOOD PRESSURE: 118 MMHG

## 2023-08-14 DIAGNOSIS — Z91.199 PATIENT'S NONCOMPLIANCE WITH OTHER MEDICAL TREATMENT AND REGIMEN DUE TO UNSPECIFIED REASON: ICD-10-CM

## 2023-08-14 DIAGNOSIS — O99.213 OBESITY COMPLICATING PREGNANCY, THIRD TRIMESTER: ICD-10-CM

## 2023-08-14 DIAGNOSIS — Z3A.36 36 WEEKS GESTATION OF PREGNANCY: ICD-10-CM

## 2023-08-14 DIAGNOSIS — O24.415 GESTATIONAL DIABETES MELLITUS IN PREGNANCY, CONTROLLED BY ORAL HYPOGLYCEMIC DRUGS: ICD-10-CM

## 2023-08-14 PROCEDURE — 76818 FETAL BIOPHYS PROFILE W/NST: CPT

## 2023-08-14 PROCEDURE — 99214 OFFICE O/P EST MOD 30 MIN: CPT | Mod: TH

## 2023-08-14 PROCEDURE — 76820 UMBILICAL ARTERY ECHO: CPT

## 2023-08-14 PROCEDURE — 93976 VASCULAR STUDY: CPT

## 2023-08-14 NOTE — CHIEF COMPLAINT
[G ___] : G [unfilled] [P ___] : P [unfilled] [de-identified] : having gestational diabetes requiring oral hypoglycemic medication

## 2023-08-14 NOTE — DISCUSSION/SUMMARY
[FreeTextEntry1] : She is 36 weeks and 1 day gestation by her last menstrual period dates.    Regarding her gestational diabetes, she had a telehealth visit with the diabetes educator on 2023 for initial diabetes education and counseling.  She had follow-up telehealth visits with the diabetes educator on 2023,2023 and 2023.  She has been taking the prescribed metformin 500 mg with either lunch or dinner.  I explained the rationale for why she needs to take the metformin medication in the morning with food.  She states that in the morning she has coffee with either toast or crackers.  I encouraged her to take the metformin in the morning with the coffee and toast or crackers.  I reminded her that the metformin medication should not be taken on an empty stomach.  She states that she eats lunch and dinner. She has been trying to follow the recommended diabetic diet. She is now keeping a daily glucose diary.  I review her glucose diary from 2023 to 2023.  The fasting glucose values range between 85 and 93.  She had 3 elevated postprandial glucose values of 183, 162, and 147.  She is not always testing her glucose after each meal.  She is not keeping a daily food diary. Hemoglobin A1c done on 2023 was elevated at 5.7% which corresponds to an estimated daily average glucose of 117 mg/dL.  I reminded her that maintaining euglycemia is the most important factor associated with good  outcomes in pregnancies complicated by gestational diabetes. I told her that poor glucose control may cause fetal macrosomia, shoulder dystocia,  delivery, stillbirth,  respiratory distress syndrome and  metabolic complications such as hypoglycemia and hyperbilirubinemia.  I gave her dietary counseling.  I again discussed which foods to eat and which foods not to eat.  I encouraged her to eat three daily meals with 3 snacks to reduce postprandial glucose fluctuations.  I reminded her to bring a food / glucose diary to all prenatal visits. She has been scheduled to have weekly fetal testing until delivery.    Regarding the timing of delivery, I told her that she is at risk for an adverse pregnancy outcome such as stillbirth due to the gestational diabetes and suboptimal glycemic control.  She is obese and non-adherent to the recommended diabetes management which are additional risk factor for stillbirths. Therefore, an early term delivery is recommended between 38 0/7 and 38 6/7 weeks of gestation (ACOG Committee Opinion No. 818, 2021).

## 2023-08-21 ENCOUNTER — APPOINTMENT (OUTPATIENT)
Dept: ANTEPARTUM | Facility: CLINIC | Age: 28
End: 2023-08-21
Payer: MEDICAID

## 2023-08-21 ENCOUNTER — APPOINTMENT (OUTPATIENT)
Dept: MATERNAL FETAL MEDICINE | Facility: CLINIC | Age: 28
End: 2023-08-21
Payer: MEDICAID

## 2023-08-21 ENCOUNTER — ASOB RESULT (OUTPATIENT)
Age: 28
End: 2023-08-21

## 2023-08-21 VITALS — WEIGHT: 233 LBS | HEIGHT: 61 IN | BODY MASS INDEX: 43.99 KG/M2

## 2023-08-21 PROCEDURE — 76818 FETAL BIOPHYS PROFILE W/NST: CPT

## 2023-08-21 PROCEDURE — G0108 DIAB MANAGE TRN  PER INDIV: CPT | Mod: 95

## 2023-08-27 RX ORDER — SODIUM CHLORIDE 9 MG/ML
1000 INJECTION, SOLUTION INTRAVENOUS
Refills: 0 | Status: DISCONTINUED | OUTPATIENT
Start: 2023-08-28 | End: 2023-08-29

## 2023-08-27 RX ORDER — OXYTOCIN 10 UNIT/ML
333.33 VIAL (ML) INJECTION
Qty: 20 | Refills: 0 | Status: COMPLETED | OUTPATIENT
Start: 2023-08-28 | End: 2023-08-29

## 2023-08-27 RX ORDER — CITRIC ACID/SODIUM CITRATE 300-500 MG
30 SOLUTION, ORAL ORAL ONCE
Refills: 0 | Status: DISCONTINUED | OUTPATIENT
Start: 2023-08-28 | End: 2023-08-29

## 2023-08-27 RX ORDER — CHLORHEXIDINE GLUCONATE 213 G/1000ML
1 SOLUTION TOPICAL DAILY
Refills: 0 | Status: DISCONTINUED | OUTPATIENT
Start: 2023-08-28 | End: 2023-08-29

## 2023-08-27 RX ORDER — SODIUM CHLORIDE 9 MG/ML
1000 INJECTION INTRAMUSCULAR; INTRAVENOUS; SUBCUTANEOUS
Refills: 0 | Status: DISCONTINUED | OUTPATIENT
Start: 2023-08-28 | End: 2023-08-29

## 2023-08-28 ENCOUNTER — INPATIENT (INPATIENT)
Facility: HOSPITAL | Age: 28
LOS: 2 days | Discharge: HOME | End: 2023-08-31
Attending: OBSTETRICS & GYNECOLOGY | Admitting: OBSTETRICS & GYNECOLOGY
Payer: COMMERCIAL

## 2023-08-28 ENCOUNTER — APPOINTMENT (OUTPATIENT)
Dept: ANTEPARTUM | Facility: CLINIC | Age: 28
End: 2023-08-28

## 2023-08-28 VITALS
HEIGHT: 61 IN | SYSTOLIC BLOOD PRESSURE: 124 MMHG | RESPIRATION RATE: 15 BRPM | DIASTOLIC BLOOD PRESSURE: 72 MMHG | WEIGHT: 231.04 LBS | TEMPERATURE: 98 F | HEART RATE: 97 BPM

## 2023-08-28 DIAGNOSIS — O24.419 GESTATIONAL DIABETES MELLITUS IN PREGNANCY, UNSPECIFIED CONTROL: ICD-10-CM

## 2023-08-28 LAB
ALBUMIN SERPL ELPH-MCNC: 3.7 G/DL — SIGNIFICANT CHANGE UP (ref 3.3–5.2)
ALP SERPL-CCNC: 123 U/L — HIGH (ref 40–120)
ALT FLD-CCNC: 8 U/L — SIGNIFICANT CHANGE UP
ANION GAP SERPL CALC-SCNC: 15 MMOL/L — SIGNIFICANT CHANGE UP (ref 5–17)
AST SERPL-CCNC: 18 U/L — SIGNIFICANT CHANGE UP
BASOPHILS # BLD AUTO: 0.03 K/UL — SIGNIFICANT CHANGE UP (ref 0–0.2)
BASOPHILS NFR BLD AUTO: 0.4 % — SIGNIFICANT CHANGE UP (ref 0–2)
BILIRUB SERPL-MCNC: <0.2 MG/DL — LOW (ref 0.4–2)
BLD GP AB SCN SERPL QL: SIGNIFICANT CHANGE UP
BUN SERPL-MCNC: 6.5 MG/DL — LOW (ref 8–20)
CALCIUM SERPL-MCNC: 9.3 MG/DL — SIGNIFICANT CHANGE UP (ref 8.4–10.5)
CHLORIDE SERPL-SCNC: 103 MMOL/L — SIGNIFICANT CHANGE UP (ref 96–108)
CO2 SERPL-SCNC: 18 MMOL/L — LOW (ref 22–29)
CREAT SERPL-MCNC: 0.36 MG/DL — LOW (ref 0.5–1.3)
EGFR: 143 ML/MIN/1.73M2 — SIGNIFICANT CHANGE UP
EOSINOPHIL # BLD AUTO: 0.11 K/UL — SIGNIFICANT CHANGE UP (ref 0–0.5)
EOSINOPHIL NFR BLD AUTO: 1.3 % — SIGNIFICANT CHANGE UP (ref 0–6)
GLUCOSE BLDC GLUCOMTR-MCNC: 95 MG/DL — SIGNIFICANT CHANGE UP (ref 70–99)
GLUCOSE SERPL-MCNC: 90 MG/DL — SIGNIFICANT CHANGE UP (ref 70–99)
HCT VFR BLD CALC: 36.7 % — SIGNIFICANT CHANGE UP (ref 34.5–45)
HGB BLD-MCNC: 12.7 G/DL — SIGNIFICANT CHANGE UP (ref 11.5–15.5)
IMM GRANULOCYTES NFR BLD AUTO: 0.7 % — SIGNIFICANT CHANGE UP (ref 0–0.9)
LYMPHOCYTES # BLD AUTO: 1.65 K/UL — SIGNIFICANT CHANGE UP (ref 1–3.3)
LYMPHOCYTES # BLD AUTO: 19.7 % — SIGNIFICANT CHANGE UP (ref 13–44)
MCHC RBC-ENTMCNC: 29 PG — SIGNIFICANT CHANGE UP (ref 27–34)
MCHC RBC-ENTMCNC: 34.6 GM/DL — SIGNIFICANT CHANGE UP (ref 32–36)
MCV RBC AUTO: 83.8 FL — SIGNIFICANT CHANGE UP (ref 80–100)
MONOCYTES # BLD AUTO: 0.77 K/UL — SIGNIFICANT CHANGE UP (ref 0–0.9)
MONOCYTES NFR BLD AUTO: 9.2 % — SIGNIFICANT CHANGE UP (ref 2–14)
NEUTROPHILS # BLD AUTO: 5.74 K/UL — SIGNIFICANT CHANGE UP (ref 1.8–7.4)
NEUTROPHILS NFR BLD AUTO: 68.7 % — SIGNIFICANT CHANGE UP (ref 43–77)
PLATELET # BLD AUTO: 296 K/UL — SIGNIFICANT CHANGE UP (ref 150–400)
POTASSIUM SERPL-MCNC: 3.7 MMOL/L — SIGNIFICANT CHANGE UP (ref 3.5–5.3)
POTASSIUM SERPL-SCNC: 3.7 MMOL/L — SIGNIFICANT CHANGE UP (ref 3.5–5.3)
PROT SERPL-MCNC: 6.8 G/DL — SIGNIFICANT CHANGE UP (ref 6.6–8.7)
RBC # BLD: 4.38 M/UL — SIGNIFICANT CHANGE UP (ref 3.8–5.2)
RBC # FLD: 14.1 % — SIGNIFICANT CHANGE UP (ref 10.3–14.5)
SODIUM SERPL-SCNC: 135 MMOL/L — SIGNIFICANT CHANGE UP (ref 135–145)
WBC # BLD: 8.36 K/UL — SIGNIFICANT CHANGE UP (ref 3.8–10.5)
WBC # FLD AUTO: 8.36 K/UL — SIGNIFICANT CHANGE UP (ref 3.8–10.5)

## 2023-08-28 RX ORDER — SODIUM CHLORIDE 9 MG/ML
1000 INJECTION, SOLUTION INTRAVENOUS
Refills: 0 | Status: DISCONTINUED | OUTPATIENT
Start: 2023-08-28 | End: 2023-08-29

## 2023-08-28 RX ADMIN — SODIUM CHLORIDE 125 MILLILITER(S): 9 INJECTION, SOLUTION INTRAVENOUS at 22:00

## 2023-08-28 RX ADMIN — SODIUM CHLORIDE 125 MILLILITER(S): 9 INJECTION, SOLUTION INTRAVENOUS at 21:45

## 2023-08-28 NOTE — OB RN PATIENT PROFILE - NSICDXPASTMEDICALHX_GEN_ALL_CORE_FT
PAST MEDICAL HISTORY:  No pertinent past medical history PAST MEDICAL HISTORY:  Anxiety     Post partum depression

## 2023-08-28 NOTE — OB PROVIDER H&P - NSICDXNOFAMILYHX_GEN_ALL_CORE
<-- Click to add NO pertinent Family History Carac Counseling:  I discussed with the patient the risks of Carac including but not limited to erythema, scaling, itching, weeping, crusting, and pain.

## 2023-08-28 NOTE — OB PROVIDER H&P - NSLOWPPHRISK_OBGYN_A_OB
No previous uterine incision/[place] Pregnancy/Less than or equal to 4 previous vaginal births/No known bleeding disorder/No history of postpartum hemorrhage/No other PPH risks indicated

## 2023-08-28 NOTE — OB PROVIDER H&P - NSPRENATALCARE_OBGYN_ALL_OB
Detail Level: Zone
Render Risk Assessment In Note?: no
Yes
Comment: this lesion has almost completely resolved. She will f/u if it recurs

## 2023-08-28 NOTE — OB RN PATIENT PROFILE - FALL HARM RISK - UNIVERSAL INTERVENTIONS
Bed in lowest position, wheels locked, appropriate side rails in place/Call bell, personal items and telephone in reach/Instruct patient to call for assistance before getting out of bed or chair/Non-slip footwear when patient is out of bed/[place] to call system/Physically safe environment - no spills, clutter or unnecessary equipment/Purposeful Proactive Rounding/Room/bathroom lighting operational, light cord in reach

## 2023-08-28 NOTE — OB PROVIDER H&P - ASSESSMENT
Patient is a 28 y.o.  at 38w GA admitted to L&D for IOL for GDMA2. Cat I FHT.  - consent  - admission labs  - IV hydration  - continuous toco and fetal heart monitoring  - GBS ____  - induction method: per attending  - pain management: epidural prn    Discussed with Dr. Marin   Patient is a 28 y.o.  at 38w GA admitted to L&D for IOL for GDMA2. Cat I FHT.  - consent  - admission labs  - IV hydration  - continuous toco and fetal heart monitoring  - GBS neg  - induction method: Vaginal cytotec  - pain management: epidural prn  -Patient desires Nexplanon implant postpartum    Discussed with Dr. Deng

## 2023-08-28 NOTE — OB PROVIDER H&P - HISTORY OF PRESENT ILLNESS
CHRISTELLE VILA is a 28y  at 38w0d GA who presents to L&D for scheduled IOL 2/2 GDMA2, on metformin.     Pt denies vaginal bleeding, contractions and leakage of fluid. She endorses good fetal movement.   Pt denies trauma.  Pt denies headaches, visual disturbances, RUQ pain, epigastric pain and new-onset edema.   She denies any urinary complaints.   She denies fevers, chills, nausea, vomiting.   She denies shortness of breath, chest pain, and palpitations.    Pregnancy course is significant for:  -GDMA2 on metformin  -Obesity     POB:  G1:  (16, FT)  G2:  (20, FT)  PGYN: -fibroids/-cysts, denied STD hx, denies abnormal PAPs  PMH: h/o hospitalization 2/2 cellulitis   PSH: Denies  SH: Denies tobacco use, EtOH use and illicit drug use during the pregnancy; Feels safe at home  Meds: PNV, metformin 500 QD   All: NKDA

## 2023-08-28 NOTE — OB PROVIDER H&P - NSHPPHYSICALEXAM_GEN_ALL_CORE
T(C): --  HR: --  BP: --  RR: --  SpO2: --    Gen: NAD, well-appearing  Heart: S1 S2, RRR  Lungs: CTAB  Abd: soft, gravid  Ext: non-edematous, non-tender   SVE:     FHT:   [place]: Vital Signs Last 24 Hrs  T(C): 36.8 (28 Aug 2023 21:11), Max: 36.8 (28 Aug 2023 21:11)  T(F): 98.2 (28 Aug 2023 21:11), Max: 98.2 (28 Aug 2023 21:11)  HR: 80 (28 Aug 2023 21:20) (80 - 97)  BP: 120/65 (28 Aug 2023 21:20) (120/65 - 124/72)    Gen: NAD, well-appearing  Heart: S1 S2, RRR  Lungs: CTAB  Abd: soft, gravid  Ext: non-edematous, non-tender   SVE: 0/0/-3    FHT: Baseline 120bpm, moderate variability, +accel, - decel  [place]: irregular ctx  Bedside sono: vertex, placenta fundal

## 2023-08-28 NOTE — OB RN PATIENT PROFILE - HEART RATE (BEATS/MIN)
Last office visit: 01/09/18  Upcoming scheduled visit: 01/31/19  Medication refill request approved per protocol with 0 refills until patient is seen at future appointment.    
97

## 2023-08-29 ENCOUNTER — APPOINTMENT (OUTPATIENT)
Dept: MATERNAL FETAL MEDICINE | Facility: CLINIC | Age: 28
End: 2023-08-29

## 2023-08-29 ENCOUNTER — NON-APPOINTMENT (OUTPATIENT)
Age: 28
End: 2023-08-29

## 2023-08-29 ENCOUNTER — TRANSCRIPTION ENCOUNTER (OUTPATIENT)
Age: 28
End: 2023-08-29

## 2023-08-29 LAB
ABO RH CONFIRMATION: SIGNIFICANT CHANGE UP
GLUCOSE BLDC GLUCOMTR-MCNC: 101 MG/DL — HIGH (ref 70–99)
GLUCOSE BLDC GLUCOMTR-MCNC: 104 MG/DL — HIGH (ref 70–99)
GLUCOSE BLDC GLUCOMTR-MCNC: 80 MG/DL — SIGNIFICANT CHANGE UP (ref 70–99)
GLUCOSE BLDC GLUCOMTR-MCNC: 84 MG/DL — SIGNIFICANT CHANGE UP (ref 70–99)
GLUCOSE BLDC GLUCOMTR-MCNC: 98 MG/DL — SIGNIFICANT CHANGE UP (ref 70–99)
T PALLIDUM AB TITR SER: NEGATIVE — SIGNIFICANT CHANGE UP

## 2023-08-29 RX ORDER — IBUPROFEN 200 MG
600 TABLET ORAL EVERY 6 HOURS
Refills: 0 | Status: DISCONTINUED | OUTPATIENT
Start: 2023-08-29 | End: 2023-08-31

## 2023-08-29 RX ORDER — KETOROLAC TROMETHAMINE 30 MG/ML
30 SYRINGE (ML) INJECTION ONCE
Refills: 0 | Status: DISCONTINUED | OUTPATIENT
Start: 2023-08-29 | End: 2023-08-29

## 2023-08-29 RX ORDER — AER TRAVELER 0.5 G/1
1 SOLUTION RECTAL; TOPICAL EVERY 4 HOURS
Refills: 0 | Status: DISCONTINUED | OUTPATIENT
Start: 2023-08-29 | End: 2023-08-31

## 2023-08-29 RX ORDER — OXYTOCIN 10 UNIT/ML
VIAL (ML) INJECTION
Qty: 30 | Refills: 0 | Status: DISCONTINUED | OUTPATIENT
Start: 2023-08-29 | End: 2023-08-29

## 2023-08-29 RX ORDER — SODIUM CHLORIDE 9 MG/ML
1000 INJECTION, SOLUTION INTRAVENOUS ONCE
Refills: 0 | Status: DISCONTINUED | OUTPATIENT
Start: 2023-08-29 | End: 2023-08-31

## 2023-08-29 RX ORDER — HYDROCORTISONE 1 %
1 OINTMENT (GRAM) TOPICAL EVERY 6 HOURS
Refills: 0 | Status: DISCONTINUED | OUTPATIENT
Start: 2023-08-29 | End: 2023-08-31

## 2023-08-29 RX ORDER — DIPHENHYDRAMINE HCL 50 MG
25 CAPSULE ORAL EVERY 6 HOURS
Refills: 0 | Status: DISCONTINUED | OUTPATIENT
Start: 2023-08-29 | End: 2023-08-31

## 2023-08-29 RX ORDER — OXYTOCIN 10 UNIT/ML
41.67 VIAL (ML) INJECTION
Qty: 20 | Refills: 0 | Status: DISCONTINUED | OUTPATIENT
Start: 2023-08-29 | End: 2023-08-31

## 2023-08-29 RX ORDER — TETANUS TOXOID, REDUCED DIPHTHERIA TOXOID AND ACELLULAR PERTUSSIS VACCINE, ADSORBED 5; 2.5; 8; 8; 2.5 [IU]/.5ML; [IU]/.5ML; UG/.5ML; UG/.5ML; UG/.5ML
0.5 SUSPENSION INTRAMUSCULAR ONCE
Refills: 0 | Status: DISCONTINUED | OUTPATIENT
Start: 2023-08-29 | End: 2023-08-31

## 2023-08-29 RX ORDER — BENZOCAINE 10 %
1 GEL (GRAM) MUCOUS MEMBRANE EVERY 6 HOURS
Refills: 0 | Status: DISCONTINUED | OUTPATIENT
Start: 2023-08-29 | End: 2023-08-31

## 2023-08-29 RX ORDER — SIMETHICONE 80 MG/1
80 TABLET, CHEWABLE ORAL EVERY 4 HOURS
Refills: 0 | Status: DISCONTINUED | OUTPATIENT
Start: 2023-08-29 | End: 2023-08-31

## 2023-08-29 RX ORDER — SODIUM CHLORIDE 9 MG/ML
3 INJECTION INTRAMUSCULAR; INTRAVENOUS; SUBCUTANEOUS EVERY 8 HOURS
Refills: 0 | Status: DISCONTINUED | OUTPATIENT
Start: 2023-08-29 | End: 2023-08-31

## 2023-08-29 RX ORDER — SODIUM CHLORIDE 9 MG/ML
1000 INJECTION, SOLUTION INTRAVENOUS
Refills: 0 | Status: DISCONTINUED | OUTPATIENT
Start: 2023-08-29 | End: 2023-08-29

## 2023-08-29 RX ORDER — PRAMOXINE HYDROCHLORIDE 150 MG/15G
1 AEROSOL, FOAM RECTAL EVERY 4 HOURS
Refills: 0 | Status: DISCONTINUED | OUTPATIENT
Start: 2023-08-29 | End: 2023-08-31

## 2023-08-29 RX ORDER — IBUPROFEN 200 MG
600 TABLET ORAL EVERY 6 HOURS
Refills: 0 | Status: COMPLETED | OUTPATIENT
Start: 2023-08-29 | End: 2024-07-27

## 2023-08-29 RX ORDER — DIBUCAINE 1 %
1 OINTMENT (GRAM) RECTAL EVERY 6 HOURS
Refills: 0 | Status: DISCONTINUED | OUTPATIENT
Start: 2023-08-29 | End: 2023-08-31

## 2023-08-29 RX ORDER — ACETAMINOPHEN 500 MG
975 TABLET ORAL
Refills: 0 | Status: DISCONTINUED | OUTPATIENT
Start: 2023-08-29 | End: 2023-08-31

## 2023-08-29 RX ORDER — MAGNESIUM HYDROXIDE 400 MG/1
30 TABLET, CHEWABLE ORAL
Refills: 0 | Status: DISCONTINUED | OUTPATIENT
Start: 2023-08-29 | End: 2023-08-31

## 2023-08-29 RX ORDER — LANOLIN
1 OINTMENT (GRAM) TOPICAL EVERY 6 HOURS
Refills: 0 | Status: DISCONTINUED | OUTPATIENT
Start: 2023-08-29 | End: 2023-08-31

## 2023-08-29 RX ADMIN — Medication 2 MILLIUNIT(S)/MIN: at 08:09

## 2023-08-29 RX ADMIN — Medication 1000 MILLIUNIT(S)/MIN: at 14:00

## 2023-08-29 RX ADMIN — Medication 30 MILLIGRAM(S): at 16:11

## 2023-08-29 RX ADMIN — Medication 975 MILLIGRAM(S): at 21:15

## 2023-08-29 RX ADMIN — SODIUM CHLORIDE 250 MILLILITER(S): 9 INJECTION INTRAMUSCULAR; INTRAVENOUS; SUBCUTANEOUS at 10:55

## 2023-08-29 NOTE — OB PROVIDER DELIVERY SUMMARY - NSSELHIDDEN_OBGYN_ALL_OB_FT
[NS_[email or web address]_ALL_OB_FT:[email or web address]],[NS_DeliveryAssist1_OBGYN_ALL_OB_FT:[id number]],[NS_DeliveryAssist2_OBGYN_ALL_OB_FT:[id number]=]

## 2023-08-29 NOTE — DISCHARGE NOTE OB - MEDICATION SUMMARY - MEDICATIONS TO TAKE
I will START or STAY ON the medications listed below when I get home from the hospital:    ibuprofen 600 mg oral tablet  -- 1 tab(s) by mouth every 6 hours  -- Indication: For As needed for pain    acetaminophen 500 mg oral tablet  -- 2 tab(s) by mouth every 6 hours  -- Indication: For As needed for pain

## 2023-08-29 NOTE — OB RN DELIVERY SUMMARY - NS_INTRAPARTUMABXTYPE_OBGYN_ALL_OB
[FreeTextEntry1] : 59 yo BRCA (-) F presents for f/u evaluation s/p right chest wall excision with SLNB of recurrent breast cancer on 3/17/20 for infiltrating ductal carcinoma ER 90%, TX 50%, HER-2 negative. Final surgical pathology revealed infiltrating ductal carcinoma, recurrent, spanning 4mm. S/p XRT with rad onc Dr. Ingram. Of note, recurrence and excision took place after patient's R mastectomy and VIECNTE flap reconstruction. 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 She is under the care of Dr. Gamez (medical oncologist) and is taking Anastrazole. Patient was diagnosed with osteoporosis for which she is getting injections. Patient was previously under the care of Dr. Martita Ivan. Patient continues to follow with Dr. Lerman. B/L MRI 9/7/22 BIRADS-2. \par  No antibiotics or any antibiotics < 2 hrs prior to birth

## 2023-08-29 NOTE — OB PROVIDER LABOR PROGRESS NOTE - NS_SUBJECTIVE/OBJECTIVE_OBGYN_ALL_OB_FT
Patient seen for placement of VCyto #1
Pt seen and reevaluated  Feeling mild contractions, declines epidural currently    T(F): 97.7 (29 Aug 2023 07:15), Max: 98.2 (28 Aug 2023 21:11)  HR: 80 (29 Aug 2023 09:26) (73 - 97)  BP: 124/84 (29 Aug 2023 09:26) (115/61 - 127/66)  RR: 17 (29 Aug 2023 07:15) (15 - 17)  Parameters below as of 28 Aug 2023 21:11  Patient On (Oxygen Delivery Method): room air

## 2023-08-29 NOTE — OB PROVIDER LABOR PROGRESS NOTE - ASSESSMENT
VSS  FHT cat I tracing   AROM'd, on pitocin, IUPC placed  Progressing in latent labor   Continue in latent labor 
Patient exam as above  VCyto #1 placed  Re-eval in 3 hours

## 2023-08-29 NOTE — OB PROVIDER DELIVERY SUMMARY - NSPROVIDERDELIVERYNOTE_OBGYN_ALL_OB_FT
Vaginal Delivery Summary    Procedure: Normal spontaneous vaginal delivery   Findings: Viable male infant delivered in cephalic presentation at 1359, placenta delivered [id number]  Apgar scores 9/9.   Weight: 2980g.  Laceration(s): None  QBL: 264  Complications: None    Procedure:   Patient s/p epi-spinal with prolonged deceleration to FHR 60s. BPs normotensive. FHR remained down, felt rectal pressure and was found to be fully dilated, +2 station. She pushed effectively. She delivered a viable fe_male infant. A loose nuchal cord X 1 was reduced on delivery of the shoulders and delayed cord clamping was performed for _seconds. Placenta delivered intact and pitocin was started at the delivery of _. Perineum and vagina were inspected, _laceration(s) present and repaired. Adequate hemostasis was obtained. Fundus was noted to be firm. Vaginal Delivery Summary    Procedure: Normal spontaneous vaginal delivery   Findings: Viable male infant delivered in cephalic presentation at 1359, placenta delivered [id number]  Apgar scores 9/9.   Weight: 2980g.  Laceration(s): None  QBL: 264  Complications: None    Procedure:   Patient s/p epi-spinal with prolonged deceleration to FHR 60s. BPs normotensive. FHR remained down, felt rectal pressure and was found to be fully dilated, +2 station. Given fetal bradycardia, decision made to apply vaccuum. Patient counseled and consented and agreeable to vaccuum. After 2 pulls and 0 pop-offs, she delivered a viable male infant. No nuchal cord present. Placenta delivered intact and pitocin was started at the delivery of the fetus. Perineum and vagina were inspected, no lacerations present. Fundus was noted to be firm.

## 2023-08-29 NOTE — OB RN DELIVERY SUMMARY - NSSELHIDDEN_OBGYN_ALL_OB_FT
[NS_DeliveryAssist1_OBGYN_ALL_OB_FT:[id number]],[NS_DeliveryRN_OBGYN_ALL_OB_FT:[id number]],[NS_CirculateRN2_OBGYN_ALL_OB_FT:[id number]=],[NS_DeliveryAttending1_OBGYN_ALL_OB_FT:[id number]=]

## 2023-08-29 NOTE — OB NEONATOLOGY/PEDIATRICIAN DELIVERY SUMMARY - NSPEDSNEONOTESA_OBGYN_ALL_OB_FT
OB requested me to attend VAVD at 38 weeks. The mother is 27 y/o, , B+, HIV, RPR, HBsAg GBS are NR, RI. She has GDMA2 on metformin.  L & D: Clear fluid, suctioned and dried, APGAR 9 & 9  Asst: full term appropriate for gestational age, BB, VAVD, IDM  Plan: observe in transition, if remain stable admit to NBN OB requested me to attend VAVD at 38 weeks. The mother is 29 y/o, , B+, HIV, RPR, HBsAg GBS are NR, RI. She has GDMA2 on metformin.  L & D: Clear fluid, suctioned and dried, APGAR 9 & 9  Asst: full term appropriate for gestational age, BB, VAVD, IDM  Plan: observe in transition, F/U a/c if remain stable admit to NBN

## 2023-08-29 NOTE — OB RN DELIVERY SUMMARY - NS_SEPSISRSKCALC_OBGYN_ALL_OB_FT
EOS calculated successfully. EOS Risk Factor: 0.5/1000 live births ([facility] national incidence); GA=38w1d; Temp=98.2; ROM=4.483; GBS='Negative'; Antibiotics='No antibiotics or any antibiotics < 2 hrs prior to birth'

## 2023-08-29 NOTE — DISCHARGE NOTE OB - PLAN OF CARE
Patient with gestational diabetes. While in the hospital, the sugars have been controlled well. Will need a 2 hour glucose test in the office at postpartum visit. Vaccuum-assisted. Patient should transition to regular activity level. Resume regular diet. Patient should follow up with her OB for postpartum checkup in 2-3 weeks. Patient should call her doctor sooner if she develops fever or uncontrolled vaginal bleeding. Take medications as directed. Exclusive breast feeding for the first 6 months is recommended. Nothing per vagina for 6 weeks (incl. sex, douching, etc). If you have additional concerns, please inform your provider.

## 2023-08-29 NOTE — OB PROVIDER LABOR PROGRESS NOTE - NS_OBIHIFHRDETAILS_OBGYN_ALL_OB_FT
FHT reviewed   Baseline 130, moderate variability, + accels, no decels
Baseline 120bpm, moderate variability, +accel, -decel

## 2023-08-29 NOTE — DISCHARGE NOTE OB - HOSPITAL COURSE
induction of labor for GDMA2. delivered via vaccuum-assisted vaginal delivery. She was transferred to postpartum unit without complications during her stay. Upon discharge she is voiding, tolerating PO, ambulating, and pain is controlled.

## 2023-08-29 NOTE — DISCHARGE NOTE OB - PATIENT PORTAL LINK FT
You can access the FollowMyHealth Patient Portal offered by [facility] by registering at the following website: [email or web address]. By joining [facility]’s FollowMyHealth portal, you will also be able to view your health information using other applications (apps) compatible with our system.

## 2023-08-29 NOTE — DISCHARGE NOTE OB - PERSISTENT HEADACHE, BLURRED VISION
Central Line Procedure Note      Staff -   Anesthesiologist:  Shamar Arteaga MD  Performed By: anesthesiologist  Location: In OR after induction  Procedure Start/Stop Times:     patient identified, IV checked, site marked, risks and benefits discussed, informed consent, monitors and equipment checked, pre-op evaluation and at physician/surgeon's request      Correct Patient: Yes      Correct Position: Yes      Correct Site: Yes      Correct Procedure: Yes      Correct Laterality:  Yes    Site Marked:  Yes  Line Placement:     Procedure:  Central Line    Insertion laterality:  Right    Insertion site:  Internal Jugular    Position:  Trendelenburg    Sterility preparation included the following: hand hygiene performed prior to central venous catheter insertion, maximum sterile barriers were used: cap, mask, sterile gown, sterile gloves, and large sterile sheet, antiseptic used during central venous catheter insertion and skin prep agent completely dried prior to procedure         Injection Technique:  Ultrasound guided and Seldinger Technique    Sterile Ultrasound Technique:  Sterile probe cover and Sterile gel    Vein evaluated via U/S for patency/adequacy of catheter insertion and is adequate.  Using realtime U/S imaging the vein was punctured, and needle was observed entering vein on U/S      A permanent image is NOT entered into the patient's record.      Local skin infiltration:  None    Catheter size:  7 Fr, 3 lumen, 20 cm    Catheter length at skin (cm):  15    Cath secured with: suture      Dressing:  Tegaderm and Biopatch    Complications:  None obvious    Blood aspirated all lumens: Yes      All Lumens Flushed: Yes  {         Statement Selected

## 2023-08-29 NOTE — DISCHARGE NOTE OB - CARE PLAN
Principal Discharge DX:	Vaginal delivery  Assessment and plan of treatment:	Vaccuum-assisted. Patient should transition to regular activity level. Resume regular diet. Patient should follow up with her OB for postpartum checkup in 2-3 weeks. Patient should call her doctor sooner if she develops fever or uncontrolled vaginal bleeding. Take medications as directed. Exclusive breast feeding for the first 6 months is recommended. Nothing per vagina for 6 weeks (incl. sex, douching, etc). If you have additional concerns, please inform your provider.  Secondary Diagnosis:	Gestational diabetes  Assessment and plan of treatment:	Patient with gestational diabetes. While in the hospital, the sugars have been controlled well. Will need a 2 hour glucose test in the office at postpartum visit.   1

## 2023-08-29 NOTE — OB RN DELIVERY SUMMARY - NS_LABORCHARACTER_OBGYN_ALL_OB
Induction of labor-AROM/Induction of labor-Medicinal/External electronic FM Induction of labor-AROM/Induction of labor-Medicinal/External electronic FM/Fetal intolerance

## 2023-08-29 NOTE — DISCHARGE NOTE OB - CARE PROVIDER_API CALL
Jess Landry  Obstetrics and Gynecology  [address] [place] [address]  Phone: [phone]  Fax: [phone]  Established Patient  Follow Up Time: 2 weeks

## 2023-08-30 LAB
HCT VFR BLD CALC: 33.1 % — LOW (ref 34.5–45)
HGB BLD-MCNC: 10.9 G/DL — LOW (ref 11.5–15.5)

## 2023-08-30 RX ORDER — ACETAMINOPHEN 500 MG
2 TABLET ORAL
Qty: 40 | Refills: 0
Start: 2023-08-30 | End: 2023-09-03

## 2023-08-30 RX ORDER — IBUPROFEN 200 MG
1 TABLET ORAL
Qty: 20 | Refills: 0
Start: 2023-08-30 | End: 2023-09-03

## 2023-08-30 RX ADMIN — Medication 600 MILLIGRAM(S): at 17:41

## 2023-08-30 RX ADMIN — Medication 975 MILLIGRAM(S): at 15:07

## 2023-08-30 RX ADMIN — Medication 975 MILLIGRAM(S): at 08:46

## 2023-08-30 RX ADMIN — Medication 975 MILLIGRAM(S): at 03:25

## 2023-08-30 RX ADMIN — Medication 600 MILLIGRAM(S): at 23:45

## 2023-08-30 RX ADMIN — Medication 600 MILLIGRAM(S): at 11:50

## 2023-08-30 RX ADMIN — Medication 600 MILLIGRAM(S): at 05:32

## 2023-08-30 NOTE — PROGRESS NOTE ADULT - ATTENDING COMMENTS
post vacuum assisted vaginal delivery day # 1  no complaints  exam wnl  labs reviewed    cleared for dc if cbc normal today  discussed contraception, vaccination, breastfeeding  follow up for post partum visit

## 2023-08-31 VITALS
DIASTOLIC BLOOD PRESSURE: 56 MMHG | SYSTOLIC BLOOD PRESSURE: 95 MMHG | RESPIRATION RATE: 18 BRPM | OXYGEN SATURATION: 98 % | HEART RATE: 73 BPM | TEMPERATURE: 98 F

## 2023-08-31 PROCEDURE — 85018 HEMOGLOBIN: CPT

## 2023-08-31 PROCEDURE — 85014 HEMATOCRIT: CPT

## 2023-08-31 PROCEDURE — 36415 COLL VENOUS BLD VENIPUNCTURE: CPT

## 2023-08-31 PROCEDURE — 82962 GLUCOSE BLOOD TEST: CPT

## 2023-08-31 PROCEDURE — 86901 BLOOD TYPING SEROLOGIC RH(D): CPT

## 2023-08-31 PROCEDURE — 80053 COMPREHEN METABOLIC PANEL: CPT

## 2023-08-31 PROCEDURE — 86900 BLOOD TYPING SEROLOGIC ABO: CPT

## 2023-08-31 PROCEDURE — 85025 COMPLETE CBC W/AUTO DIFF WBC: CPT

## 2023-08-31 PROCEDURE — 86850 RBC ANTIBODY SCREEN: CPT

## 2023-08-31 PROCEDURE — 86780 TREPONEMA PALLIDUM: CPT

## 2023-08-31 PROCEDURE — 59050 FETAL MONITOR W/REPORT: CPT

## 2023-08-31 RX ORDER — LIDOCAINE HCL 20 MG/ML
10 VIAL (ML) INJECTION ONCE
Refills: 0 | Status: DISCONTINUED | OUTPATIENT
Start: 2023-08-31 | End: 2023-08-31

## 2023-08-31 RX ORDER — ETONOGESTREL 68 MG/1
68 IMPLANT SUBCUTANEOUS ONCE
Refills: 0 | Status: DISCONTINUED | OUTPATIENT
Start: 2023-08-31 | End: 2023-08-31

## 2023-08-31 RX ADMIN — Medication 975 MILLIGRAM(S): at 08:50

## 2023-08-31 RX ADMIN — Medication 600 MILLIGRAM(S): at 05:47

## 2023-08-31 NOTE — PROGRESS NOTE ADULT - ATTENDING COMMENTS
PPD2  no complaints   Hb 10.9, mild asymptomatic anemia   breast feeding   pt will take nexplanon in clinic on post partum visit  plan  wants circumcision prior to discharge   DC home today

## 2023-08-31 NOTE — PROGRESS NOTE ADULT - SUBJECTIVE AND OBJECTIVE BOX
INTERVAL HPI/OVERNIGHT EVENTS:  28y Female s/p labor epidural, on 08/29/23    Vital Signs Last 24 Hrs  T(C): 36.6 (30 Aug 2023 04:00), Max: 36.8 (29 Aug 2023 15:50)  T(F): 97.9 (30 Aug 2023 04:00), Max: 98.2 (29 Aug 2023 15:50)  HR: 69 (30 Aug 2023 04:00) (57 - 69)  BP: 93/53 (30 Aug 2023 04:00) (93/53 - 122/76)  BP(mean): --  RR: 17 (30 Aug 2023 04:00) (16 - 17)  SpO2: 97% (30 Aug 2023 04:00) (97% - 98%)    Parameters below as of 30 Aug 2023 04:00  Patient On (Oxygen Delivery Method): room air            Patient's overall anesthesia satisfied    Patient seen and doing well     No headache      No residual numbness or weakness, sensory and motor function intact    Site not examined     No anesthetic complications or complaints noted or reported                 
                      10.9   x     )-----------( x        ( 30 Aug 2023 05:36 )             33.1   CHRISTELLE VILA is a 28y  now PPD#2 s/p Vacuum-assisted vaginal delivery at 38w1d weeks gestation, c/b GDMA1.    S:    The patient has no complaints.  Pain controlled with current treatment regimen.   She is ambulating without difficulty and tolerating PO.   + flatus/-BM/+ voiding   She endorses appropriate lochia, which is decreasing.      O:    T(F): 98.1 (31 Aug 2023 03:55), Max: 98.4 (30 Aug 2023 15:34)  HR: 73 (31 Aug 2023 03:55) (68 - 73)  BP: 95/56 (31 Aug 2023 03:55) (95/56 - 98/61)  RR: 18 (31 Aug 2023 03:55) (18 - 18)  SpO2: 98% (31 Aug 2023 03:55) (98% - 98%)      Gen: NAD, AOx3  Pulm: Breathing comfortably on RA  Abdomen:  Soft, non-tender, non-distended  Uterus:  Fundus firm below umbilicus  VE:  +Lochia  Ext:  Non-tender and non-edematous      
CHRISTELLE VILA is a 28y  now PPD#1 s/p Vacuum-assisted vaginal delivery at 38w1d weeks gestation, c/b GDMA1.    S:    The patient has no complaints.  Pain controlled with current treatment regimen.   She is ambulating without difficulty and tolerating PO.   + flatus/-BM/+ voiding   She endorses appropriate lochia, which is decreasing.      O:    T(C): 36.6 (23 @ 04:00), Max: 36.8 (23 @ 15:50)  HR: 69 (23 @ 04:00) (57 - 116)  BP: 93/53 (23 @ 04:00) (93/53 - 135/59)  RR: 17 (23 @ 04:00) (16 - 19)  SpO2: 97% (23 @ 04:00) (90% - 100%)    Gen: NAD, AOx3  Pulm: Breathing comfortably on RA  Abdomen:  Soft, non-tender, non-distended  Uterus:  Fundus firm below umbilicus  VE:  +Lochia  Ext:  Non-tender and non-edematous      LABS                        12.7   8.36  )-----------( 296      ( 28 Aug 2023 21:53 )             36.7         135  |  103  |  6.5<L>  ----------------------------<  90  3.7   |  18.0<L>  |  0.36<L>    Ca    9.3      28 Aug 2023 21:53    TPro  6.8  /  Alb  3.7  /  TBili  <0.2<L>  /  DBili  x   /  AST  18  /  ALT  8   /  AlkPhos  123<H>

## 2023-08-31 NOTE — PROGRESS NOTE ADULT - ASSESSMENT
A/P:  28y  now PPD#2 s/p Vacuum-assisted vaginal delivery at 38w1d weeks gestation, c/b GDMA1.  -Vital signs stable  -Hgb: 12.7 ->10.9  -Voiding, tolerating PO, bowel function nml   -Advance care as tolerated   -Continue routine postpartum care and education  -Healthy male infant, desires circumcision  - Would like Nexplanon placed today before she leaves  -Dispo: Pt is stable for discharge today pending attending approval
A/P:  28y  now PPD#1 s/p Vacuum-assisted vaginal delivery at 38w1d weeks gestation, c/b GDMA1.  -Vital signs stable  -Hgb: 12.7 -> AM labs pending   -Voiding, tolerating PO, bowel function nml   -Advance care as tolerated   -Continue routine postpartum care and education  -Healthy male infant, unsure about circumcision  -Dispo: Pt is stable for discharge today pending attending approval

## 2023-09-05 ENCOUNTER — APPOINTMENT (OUTPATIENT)
Dept: ANTEPARTUM | Facility: CLINIC | Age: 28
End: 2023-09-05

## 2023-09-12 ENCOUNTER — APPOINTMENT (OUTPATIENT)
Dept: MATERNAL FETAL MEDICINE | Facility: CLINIC | Age: 28
End: 2023-09-12
Payer: MEDICAID

## 2023-09-12 ENCOUNTER — ASOB RESULT (OUTPATIENT)
Age: 28
End: 2023-09-12

## 2023-09-12 DIAGNOSIS — Z86.32 PERSONAL HISTORY OF GESTATIONAL DIABETES: ICD-10-CM

## 2023-09-12 PROCEDURE — G0108 DIAB MANAGE TRN  PER INDIV: CPT | Mod: 95

## 2023-10-16 ENCOUNTER — OFFICE (OUTPATIENT)
Dept: URBAN - METROPOLITAN AREA CLINIC 94 | Facility: CLINIC | Age: 28
Setting detail: OPHTHALMOLOGY
End: 2023-10-16
Payer: COMMERCIAL

## 2023-10-16 DIAGNOSIS — H43.391: ICD-10-CM

## 2023-10-16 DIAGNOSIS — H25.13: ICD-10-CM

## 2023-10-16 DIAGNOSIS — H02.89: ICD-10-CM

## 2023-10-16 DIAGNOSIS — H04.123: ICD-10-CM

## 2023-10-16 DIAGNOSIS — H10.011: ICD-10-CM

## 2023-10-16 DIAGNOSIS — H47.233: ICD-10-CM

## 2023-10-16 DIAGNOSIS — H43.811: ICD-10-CM

## 2023-10-16 PROCEDURE — 99213 OFFICE O/P EST LOW 20 MIN: CPT | Performed by: OPHTHALMOLOGY

## 2023-10-16 PROCEDURE — 92133 CPTRZD OPH DX IMG PST SGM ON: CPT | Performed by: OPHTHALMOLOGY

## 2023-10-16 ASSESSMENT — REFRACTION_AUTOREFRACTION
OD_SPHERE: +1.00
OS_SPHERE: +0.75
OS_CYLINDER: -0.50
OS_AXIS: 059
OD_AXIS: 177
OD_CYLINDER: -0.50

## 2023-10-16 ASSESSMENT — REFRACTION_MANIFEST
OD_CYLINDER: -0.55
OD_VA1: 20/20
OD_AXIS: 175
OS_SPHERE: PL
OD_AXIS: 170
OD_SPHERE: PL
OS_SPHERE: +0.75
OS_CYLINDER: +0.25
OD_VA1: 20/25-2
OS_VA1: 20/20
OS_SPHERE: PLANO
OS_AXIS: 165
OD_SPHERE: -0.50
OS_VA1: 20/20-2
OD_SPHERE: +0.50
OS_CYLINDER: -0.75
OS_AXIS: 075
OS_CYLINDER: -0.50
OS_AXIS: 175
OD_CYLINDER: +0.50
OD_AXIS: 085
OD_CYLINDER: -0.75

## 2023-10-16 ASSESSMENT — KERATOMETRY
OS_K2POWER_DIOPTERS: 46.00
OD_K2POWER_DIOPTERS: 46.25
OD_K1POWER_DIOPTERS: 44.75
METHOD_AUTO_MANUAL: AUTO
OS_AXISANGLE_DEGREES: 098
OD_AXISANGLE_DEGREES: 028
OS_K1POWER_DIOPTERS: 45.25

## 2023-10-16 ASSESSMENT — LID EXAM ASSESSMENTS
OS_MEIBOMITIS: LLL 1+ 2+
OD_MEIBOMITIS: RLL 1+

## 2023-10-16 ASSESSMENT — VISUAL ACUITY
OD_BCVA: 20/20
OS_BCVA: 20/20

## 2023-10-16 ASSESSMENT — SPHEQUIV_DERIVED
OD_SPHEQUIV: 0.225
OD_SPHEQUIV: 0.75
OS_SPHEQUIV: 0.5
OD_SPHEQUIV: -0.25
OS_SPHEQUIV: 0.5

## 2023-10-16 ASSESSMENT — AXIALLENGTH_DERIVED
OS_AL: 22.6553
OD_AL: 22.6079
OS_AL: 22.6553
OD_AL: 22.9714
OD_AL: 22.7973

## 2023-10-16 ASSESSMENT — SUPERFICIAL PUNCTATE KERATITIS (SPK)
OD_SPK: 1+
OS_SPK: 1+

## 2024-01-02 NOTE — OB RN DELIVERY SUMMARY - [facility]_OBGYN_ALL_OB
[FreeTextEntry1] : Patient relays less anxiety related to medical co-morbidities as she made improvement with URO/GYN. She appears much clearer today and has normal speech.  Current medications 1.remeron 15mg at bedtime, unwilling to increase or try alternative SSRI/SNRI was d/c now on elavil 10mg at bedtime from PMD 2. klonopin 0.5mg po BID 3. hydroxyzine 25mg-75mg at bedtime prn (will no longer use loratadine)    follow up in 4 weeks 
[FreeTextEntry1] : Patient relays less anxiety related to medical co-morbidities as she made improvement with URO/GYN. She appears much clearer today and has normal speech.  Current medications 1.remeron 15mg at bedtime, unwilling to increase or try alternative SSRI/SNRI was d/c now on elavil 10mg at bedtime from PMD 2. klonopin 0.5mg po BID 3. hydroxyzine 25mg-75mg at bedtime prn (will no longer use loratadine)    follow up in 4 weeks 
Was not done

## 2024-04-04 PROBLEM — F41.9 ANXIETY DISORDER, UNSPECIFIED: Chronic | Status: ACTIVE | Noted: 2023-08-28

## 2024-06-11 ENCOUNTER — APPOINTMENT (OUTPATIENT)
Dept: OBGYN | Facility: CLINIC | Age: 29
End: 2024-06-11

## 2025-01-27 NOTE — OB HISTORY
January 27, 2025     Patient: Ann Marie Bunch   YOB: 1962   Date of Visit: 1/27/2025       To Whom It May Concern:    Ann Marie Bunch was seen today, 1-27-25 in my office. She is able to return to work on 1-27-25.           Sincerely,        Magali Victor MD       [Pregnancy History] : patient received anesthesia [LMP: ___] : LMP: [unfilled] [GUEVARA: ___] : GUEVARA: [unfilled] [Spontaneous] : Spontaneous conception [Sonogram] : sonogram [Definite:  ___ (Date)] : the last menstrual period was [unfilled] [Normal Amount/Duration] : was of a normal amount and duration [Regular Cycle Intervals] : periods have been regular [EGA: ___ wks] : EGA: [unfilled] wks [at ___ wks] : at [unfilled] weeks [___] : no pregnancy complications reported [FreeTextEntry1] : Prenatal medical records were not available for review.  The 3-hour GTT done on 5/31/2023 reported a fasting glucose of 166, 1 hour glucose of 221, 2-hour glucose of 158, and 3-hour glucose of 131.  There were 3 elevated glucose values consistent with a diagnosis of gestational diabetes.   She had an MFM consultation on 7/13/2023 and 7/26/2023. [Spotting Between  Menses] : no spotting between menses [Menstrual Cramps] : no menstrual cramps [On BCP at conception] : the patient was not on BCP at conception

## 2025-09-02 ENCOUNTER — APPOINTMENT (OUTPATIENT)
Dept: NEUROLOGY | Facility: CLINIC | Age: 30
End: 2025-09-02
Payer: COMMERCIAL

## 2025-09-02 ENCOUNTER — NON-APPOINTMENT (OUTPATIENT)
Age: 30
End: 2025-09-02

## 2025-09-02 VITALS
HEART RATE: 76 BPM | DIASTOLIC BLOOD PRESSURE: 74 MMHG | WEIGHT: 220 LBS | HEIGHT: 61 IN | SYSTOLIC BLOOD PRESSURE: 112 MMHG | BODY MASS INDEX: 41.54 KG/M2

## 2025-09-02 DIAGNOSIS — G44.89 OTHER HEADACHE SYNDROME: ICD-10-CM

## 2025-09-02 PROCEDURE — 99204 OFFICE O/P NEW MOD 45 MIN: CPT

## 2025-09-02 PROCEDURE — G2211 COMPLEX E/M VISIT ADD ON: CPT | Mod: NC

## 2025-09-02 RX ORDER — AMITRIPTYLINE HYDROCHLORIDE 25 MG/1
25 TABLET, FILM COATED ORAL
Qty: 90 | Refills: 1 | Status: ACTIVE | COMMUNITY
Start: 2025-09-02 | End: 1900-01-01

## 2025-09-12 ENCOUNTER — APPOINTMENT (OUTPATIENT)
Dept: MRI IMAGING | Facility: CLINIC | Age: 30
End: 2025-09-12
Payer: COMMERCIAL

## 2025-09-12 PROCEDURE — 70551 MRI BRAIN STEM W/O DYE: CPT | Mod: 26
